# Patient Record
Sex: FEMALE
[De-identification: names, ages, dates, MRNs, and addresses within clinical notes are randomized per-mention and may not be internally consistent; named-entity substitution may affect disease eponyms.]

---

## 2017-05-09 ENCOUNTER — CHARTING TRANS (OUTPATIENT)
Dept: OTHER | Age: 27
End: 2017-05-09

## 2017-05-11 ENCOUNTER — CHARTING TRANS (OUTPATIENT)
Dept: OTHER | Age: 27
End: 2017-05-11

## 2018-02-21 PROCEDURE — 86200 CCP ANTIBODY: CPT | Performed by: INTERNAL MEDICINE

## 2018-03-12 PROBLEM — M25.542 ARTHRALGIA OF BOTH HANDS: Status: ACTIVE | Noted: 2018-03-12

## 2018-03-12 PROBLEM — M25.541 ARTHRALGIA OF BOTH HANDS: Status: ACTIVE | Noted: 2018-03-12

## 2018-04-28 ENCOUNTER — HOSPITAL ENCOUNTER (EMERGENCY)
Facility: HOSPITAL | Age: 28
Discharge: HOME | End: 2018-04-29
Attending: EMERGENCY MEDICINE
Payer: COMMERCIAL

## 2018-04-28 DIAGNOSIS — K52.9 COLITIS: Primary | ICD-10-CM

## 2018-04-28 PROCEDURE — 81025 URINE PREGNANCY TEST: CPT

## 2018-04-28 PROCEDURE — 80048 BASIC METABOLIC PNL TOTAL CA: CPT | Performed by: EMERGENCY MEDICINE

## 2018-04-28 PROCEDURE — 36415 COLL VENOUS BLD VENIPUNCTURE: CPT

## 2018-04-28 PROCEDURE — 80076 HEPATIC FUNCTION PANEL: CPT | Performed by: EMERGENCY MEDICINE

## 2018-04-28 PROCEDURE — 99283 EMERGENCY DEPT VISIT LOW MDM: CPT

## 2018-04-28 PROCEDURE — 83690 ASSAY OF LIPASE: CPT | Performed by: EMERGENCY MEDICINE

## 2018-04-28 PROCEDURE — 81001 URINALYSIS AUTO W/SCOPE: CPT

## 2018-04-28 PROCEDURE — 81001 URINALYSIS AUTO W/SCOPE: CPT | Performed by: EMERGENCY MEDICINE

## 2018-04-28 PROCEDURE — 85025 COMPLETE CBC W/AUTO DIFF WBC: CPT | Performed by: EMERGENCY MEDICINE

## 2018-04-29 VITALS
TEMPERATURE: 97 F | RESPIRATION RATE: 14 BRPM | SYSTOLIC BLOOD PRESSURE: 153 MMHG | OXYGEN SATURATION: 98 % | HEART RATE: 77 BPM | DIASTOLIC BLOOD PRESSURE: 70 MMHG

## 2018-04-29 NOTE — ED PROVIDER NOTES
Patient Seen in: [facility] AND [facility] Emergency Department     History   Patient presents with:  Abdomen/Flank Pain (GI/)    Stated Complaint:     HPI    33year old female complains of left-sided abdominal pain for the past week, associated with diarrhea. otherwise stated in HPI.     Physical Exam   ED Triage Vitals [04/28/18 2104]  BP: 153/70  Pulse: 88  Resp: 20  Temp: (!) 97.2 °F (36.2 °C)  Temp src: Temporal  SpO2: 99 %  O2 Device: None (Room air)    Current:/70   Pulse 88   Temp (!) 97.2 °F (36.2 PANEL (7) - Abnormal; Notable for the following:     AST 48 (*)      (*)     All other components within normal limits   CBC W/ DIFFERENTIAL - Abnormal; Notable for the following:     RBC 5.48 (*)     MCV 76.8 (*)     MCH 25.4 (*)     All other comp evaluation. Medical Record Review:   I personally reviewed available prior medical records for any recent pertinent discharge summaries, testing, and procedures and reviewed those reports.      Radiology Interpretation:   None    Monitor Interpretation: and follow up information were provided prior to discharge from the ED if sent home.  We also recommended that the patient schedule follow up care with a primary care provider as soon as possible to obtain basic health screening including reassessment of bl

## 2018-04-30 ENCOUNTER — HOSPITAL ENCOUNTER (OUTPATIENT)
Age: 28
Discharge: ANOTHER HOSPITAL | End: 2018-04-30
Payer: COMMERCIAL

## 2018-04-30 ENCOUNTER — APPOINTMENT (OUTPATIENT)
Dept: CT IMAGING | Facility: HOSPITAL | Age: 28
End: 2018-04-30
Attending: EMERGENCY MEDICINE
Payer: COMMERCIAL

## 2018-04-30 ENCOUNTER — HOSPITAL ENCOUNTER (EMERGENCY)
Facility: HOSPITAL | Age: 28
Discharge: HOME | End: 2018-04-30
Attending: EMERGENCY MEDICINE
Payer: COMMERCIAL

## 2018-04-30 VITALS
BODY MASS INDEX: 36.29 KG/M2 | DIASTOLIC BLOOD PRESSURE: 65 MMHG | TEMPERATURE: 98 F | WEIGHT: 180 LBS | RESPIRATION RATE: 18 BRPM | HEART RATE: 62 BPM | SYSTOLIC BLOOD PRESSURE: 106 MMHG | OXYGEN SATURATION: 99 % | HEIGHT: 59 IN

## 2018-04-30 VITALS
HEART RATE: 76 BPM | WEIGHT: 183 LBS | RESPIRATION RATE: 16 BRPM | BODY MASS INDEX: 36.89 KG/M2 | DIASTOLIC BLOOD PRESSURE: 69 MMHG | HEIGHT: 59 IN | SYSTOLIC BLOOD PRESSURE: 105 MMHG | OXYGEN SATURATION: 98 % | TEMPERATURE: 98 F

## 2018-04-30 DIAGNOSIS — R10.84 ABDOMINAL PAIN, GENERALIZED: Primary | ICD-10-CM

## 2018-04-30 DIAGNOSIS — R10.9 ABDOMINAL PAIN, ACUTE: Primary | ICD-10-CM

## 2018-04-30 PROCEDURE — 80048 BASIC METABOLIC PNL TOTAL CA: CPT | Performed by: EMERGENCY MEDICINE

## 2018-04-30 PROCEDURE — 81002 URINALYSIS NONAUTO W/O SCOPE: CPT

## 2018-04-30 PROCEDURE — 81001 URINALYSIS AUTO W/SCOPE: CPT | Performed by: EMERGENCY MEDICINE

## 2018-04-30 PROCEDURE — 96360 HYDRATION IV INFUSION INIT: CPT

## 2018-04-30 PROCEDURE — 85025 COMPLETE CBC W/AUTO DIFF WBC: CPT | Performed by: EMERGENCY MEDICINE

## 2018-04-30 PROCEDURE — 99284 EMERGENCY DEPT VISIT MOD MDM: CPT

## 2018-04-30 PROCEDURE — 81025 URINE PREGNANCY TEST: CPT

## 2018-04-30 PROCEDURE — 99212 OFFICE O/P EST SF 10 MIN: CPT

## 2018-04-30 PROCEDURE — 74177 CT ABD & PELVIS W/CONTRAST: CPT | Performed by: EMERGENCY MEDICINE

## 2018-04-30 PROCEDURE — 80076 HEPATIC FUNCTION PANEL: CPT | Performed by: EMERGENCY MEDICINE

## 2018-04-30 PROCEDURE — 96361 HYDRATE IV INFUSION ADD-ON: CPT

## 2018-04-30 PROCEDURE — 83690 ASSAY OF LIPASE: CPT | Performed by: EMERGENCY MEDICINE

## 2018-04-30 RX ORDER — DICYCLOMINE HCL 20 MG
20 TABLET ORAL 4 TIMES DAILY PRN
Qty: 10 TABLET | Refills: 0 | Status: SHIPPED | OUTPATIENT
Start: 2018-04-30 | End: 2018-07-02

## 2018-04-30 NOTE — ED PROVIDER NOTES
Patient presents with:  Abdomen/Flank Pain (GI/)      HPI:     Mamadou Topete is a 33year old female with a past history of seizures presents with generalized abdominal pain.   Patient reports she was seen in the ER on 4/28 and had blood work complet benefit her at this time. Patient states she would like to go to the ER instead of calling to schedule a follow-up appointment with her doctor.       Orders Placed This Encounter      POC Urinalysis Dipstick Once      POCT Pregnancy, Urine    Labs performe

## 2018-04-30 NOTE — ED NOTES
Patient was seen here last week for abdominal pain and was told she had colitis, and to follow up. Patient states she was unable to, and went to IC today for generalized abd pain. IC told her to come here. States she is dizzy, and has diarrhea.

## 2018-04-30 NOTE — ED INITIAL ASSESSMENT (HPI)
Pt presents with intermittent mid abdominal pain since Saturday, was seen in ER. No relief with ibuprofen, describes pain as cramping.

## 2018-04-30 NOTE — ED INITIAL ASSESSMENT (HPI)
Went to the ed 4/28 extensive work up for abd pain and told she had colitis given nothing med wise and told to follow up with pcp. Has not called or made appointment , here now because pain is worse sharp constant diffuse pain.  Still has diarrhea and feels

## 2018-04-30 NOTE — ED PROVIDER NOTES
Patient Seen in: [facility] AND [facility] Emergency Department    History   Patient presents with:  Abdomen/Flank Pain (GI/)    Stated Complaint: abdominal pain    HPI    41-year-old female with history of seizure disorder and Tourette's syndrome presents wit distress  Eyes: pupils equal and round no pallor or injection  ENT, Mouth: mucous membranes moist  Respiratory: there are no retractions, lungs are clear to auscultation  Cardiovascular: regular rate and rhythm  Gastrointestinal:  abdomen is diffusely tend 1700  ------------------------------------------------------------       MDM     Lab and CT results noted. No cause of the patient's pain found at this time. Will discharge the patient home with Bentyl and plans to follow-up with her primary physician.

## 2018-07-02 PROCEDURE — 87591 N.GONORRHOEAE DNA AMP PROB: CPT | Performed by: NURSE PRACTITIONER

## 2018-07-02 PROCEDURE — 81001 URINALYSIS AUTO W/SCOPE: CPT | Performed by: NURSE PRACTITIONER

## 2018-07-02 PROCEDURE — 88175 CYTOPATH C/V AUTO FLUID REDO: CPT | Performed by: NURSE PRACTITIONER

## 2018-07-02 PROCEDURE — 87491 CHLMYD TRACH DNA AMP PROBE: CPT | Performed by: NURSE PRACTITIONER

## 2018-07-10 PROCEDURE — 88305 TISSUE EXAM BY PATHOLOGIST: CPT | Performed by: INTERNAL MEDICINE

## 2018-10-30 PROCEDURE — 81003 URINALYSIS AUTO W/O SCOPE: CPT | Performed by: INTERNAL MEDICINE

## 2018-10-30 PROCEDURE — 81025 URINE PREGNANCY TEST: CPT | Performed by: INTERNAL MEDICINE

## 2018-11-03 VITALS
OXYGEN SATURATION: 98 % | SYSTOLIC BLOOD PRESSURE: 118 MMHG | RESPIRATION RATE: 16 BRPM | HEIGHT: 59 IN | DIASTOLIC BLOOD PRESSURE: 74 MMHG | WEIGHT: 182.61 LBS | TEMPERATURE: 98.2 F | BODY MASS INDEX: 36.81 KG/M2 | HEART RATE: 89 BPM

## 2019-02-06 PROBLEM — G43.909 MIGRAINE: Status: ACTIVE | Noted: 2019-02-06

## 2019-03-25 PROCEDURE — 86480 TB TEST CELL IMMUN MEASURE: CPT | Performed by: FAMILY MEDICINE

## 2019-03-26 PROCEDURE — 88175 CYTOPATH C/V AUTO FLUID REDO: CPT | Performed by: OBSTETRICS & GYNECOLOGY

## 2019-04-12 PROCEDURE — 36415 COLL VENOUS BLD VENIPUNCTURE: CPT | Performed by: FAMILY MEDICINE

## 2019-04-12 PROCEDURE — 80074 ACUTE HEPATITIS PANEL: CPT | Performed by: FAMILY MEDICINE

## 2019-04-17 ENCOUNTER — HOSPITAL ENCOUNTER (EMERGENCY)
Facility: HOSPITAL | Age: 29
Discharge: HOME | End: 2019-04-17
Payer: COMMERCIAL

## 2019-04-17 VITALS
SYSTOLIC BLOOD PRESSURE: 103 MMHG | RESPIRATION RATE: 16 BRPM | TEMPERATURE: 98 F | HEIGHT: 59 IN | OXYGEN SATURATION: 97 % | HEART RATE: 69 BPM | BODY MASS INDEX: 36.29 KG/M2 | DIASTOLIC BLOOD PRESSURE: 64 MMHG | WEIGHT: 180 LBS

## 2019-04-17 DIAGNOSIS — N39.0 URINARY TRACT INFECTION WITHOUT HEMATURIA, SITE UNSPECIFIED: Primary | ICD-10-CM

## 2019-04-17 PROCEDURE — 96365 THER/PROPH/DIAG IV INF INIT: CPT

## 2019-04-17 PROCEDURE — 81001 URINALYSIS AUTO W/SCOPE: CPT

## 2019-04-17 PROCEDURE — 80048 BASIC METABOLIC PNL TOTAL CA: CPT

## 2019-04-17 PROCEDURE — 96375 TX/PRO/DX INJ NEW DRUG ADDON: CPT

## 2019-04-17 PROCEDURE — 93010 ELECTROCARDIOGRAM REPORT: CPT | Performed by: INTERNAL MEDICINE

## 2019-04-17 PROCEDURE — 87086 URINE CULTURE/COLONY COUNT: CPT

## 2019-04-17 PROCEDURE — 93005 ELECTROCARDIOGRAM TRACING: CPT

## 2019-04-17 PROCEDURE — 96361 HYDRATE IV INFUSION ADD-ON: CPT

## 2019-04-17 PROCEDURE — 84484 ASSAY OF TROPONIN QUANT: CPT | Performed by: NURSE PRACTITIONER

## 2019-04-17 PROCEDURE — 81025 URINE PREGNANCY TEST: CPT

## 2019-04-17 PROCEDURE — 99285 EMERGENCY DEPT VISIT HI MDM: CPT

## 2019-04-17 PROCEDURE — 85025 COMPLETE CBC W/AUTO DIFF WBC: CPT

## 2019-04-17 RX ORDER — CEPHALEXIN 500 MG/1
500 CAPSULE ORAL 2 TIMES DAILY
Qty: 14 CAPSULE | Refills: 0 | Status: SHIPPED | OUTPATIENT
Start: 2019-04-17 | End: 2019-04-24

## 2019-04-17 RX ORDER — CEPHALEXIN 500 MG/1
500 CAPSULE ORAL 4 TIMES DAILY
Qty: 40 CAPSULE | Refills: 0 | Status: SHIPPED | OUTPATIENT
Start: 2019-04-17 | End: 2019-04-17 | Stop reason: CLARIF

## 2019-04-17 RX ORDER — KETOROLAC TROMETHAMINE 30 MG/ML
30 INJECTION, SOLUTION INTRAMUSCULAR; INTRAVENOUS ONCE
Status: COMPLETED | OUTPATIENT
Start: 2019-04-17 | End: 2019-04-17

## 2019-04-17 NOTE — ED INITIAL ASSESSMENT (HPI)
Pt c/o headaches, intermittent periods of dizziness + chest and abdominal pain/cramping beginning after starting medication to \"start her period\" 4/10. Pt also states she was in such severe pain on Saturday it \"triggered a seizure\".  Pt denies being see

## 2019-04-18 NOTE — ED NOTES
Pt presents with multiple complaints. Pt states her stomach hurt \"really bad earlier\" and she feels nauseous. Pt also complains of dizziness. Pt ax4, does not appear in any distress.

## 2019-04-18 NOTE — ED PROVIDER NOTES
Patient Seen in: [facility] AND [facility] Emergency Department    History   Patient presents with:  Abdomen/Flank Pain (GI/)  Dizziness (neurologic)    Stated Complaint: Chest Pain/ Dizzy/ Cramps    HPI    Patient complains of lower abdominal pain that began with meals. Meclizine HCl 25 MG Oral Tab,  Take 1 tablet (25 mg total) by mouth 3 (three) times daily as needed for Dizziness (May make you sleepy).        Family History   Problem Relation Age of Onset   • Diabetes Father    • Asthma Mother        Social URINALYSIS WITH CULTURE REFLEX - Abnormal; Notable for the following components:       Result Value    Blood Urine Moderate (*)     Leukocyte Esterase Urine Small (*)     WBC Urine 8 (*)     Bacteria Urine Few (*)     All other components within normal l times daily for 7 days.   Qty: 14 capsule Refills: 0

## 2019-11-05 PROBLEM — F95.8 MOTOR TIC DISORDER: Status: ACTIVE | Noted: 2019-11-05

## 2020-02-25 PROBLEM — K60.2 ANAL FISSURE: Status: ACTIVE | Noted: 2020-02-25

## 2020-02-25 PROBLEM — K64.8 INTERNAL HEMORRHOIDS WITH COMPLICATION: Status: ACTIVE | Noted: 2020-02-25

## 2020-02-25 PROBLEM — K62.5 RECTAL BLEEDING: Status: ACTIVE | Noted: 2020-02-25

## 2020-02-25 PROBLEM — K64.4 EXTERNAL HEMORRHOIDS: Status: ACTIVE | Noted: 2020-02-25

## 2020-08-11 ENCOUNTER — HOSPITAL ENCOUNTER (EMERGENCY)
Facility: HOSPITAL | Age: 30
Discharge: HOME | End: 2020-08-11
Attending: EMERGENCY MEDICINE
Payer: COMMERCIAL

## 2020-08-11 ENCOUNTER — APPOINTMENT (OUTPATIENT)
Dept: CT IMAGING | Facility: HOSPITAL | Age: 30
End: 2020-08-11
Attending: EMERGENCY MEDICINE
Payer: COMMERCIAL

## 2020-08-11 VITALS
DIASTOLIC BLOOD PRESSURE: 72 MMHG | WEIGHT: 195.75 LBS | RESPIRATION RATE: 16 BRPM | TEMPERATURE: 99 F | HEART RATE: 86 BPM | BODY MASS INDEX: 40 KG/M2 | OXYGEN SATURATION: 99 % | SYSTOLIC BLOOD PRESSURE: 110 MMHG

## 2020-08-11 DIAGNOSIS — R10.9 ABDOMINAL PAIN, ACUTE: Primary | ICD-10-CM

## 2020-08-11 LAB
ALBUMIN SERPL-MCNC: 3.7 G/DL (ref 3.4–5)
ALBUMIN/GLOB SERPL: 0.9 {RATIO} (ref 1–2)
ALP LIVER SERPL-CCNC: 116 U/L (ref 37–98)
ALT SERPL-CCNC: 189 U/L (ref 13–56)
ANION GAP SERPL CALC-SCNC: 6 MMOL/L (ref 0–18)
AST SERPL-CCNC: 81 U/L (ref 15–37)
B-HCG UR QL: NEGATIVE
BASOPHILS # BLD AUTO: 0.06 X10(3) UL (ref 0–0.2)
BASOPHILS NFR BLD AUTO: 0.6 %
BILIRUB SERPL-MCNC: 0.5 MG/DL (ref 0.1–2)
BILIRUB UR QL: NEGATIVE
BUN BLD-MCNC: 13 MG/DL (ref 7–18)
BUN/CREAT SERPL: 16.7 (ref 10–20)
CALCIUM BLD-MCNC: 9.2 MG/DL (ref 8.5–10.1)
CHLORIDE SERPL-SCNC: 106 MMOL/L (ref 98–112)
CLARITY UR: CLEAR
CO2 SERPL-SCNC: 26 MMOL/L (ref 21–32)
COLOR UR: YELLOW
CREAT BLD-MCNC: 0.78 MG/DL (ref 0.55–1.02)
DEPRECATED RDW RBC AUTO: 36.4 FL (ref 35.1–46.3)
EOSINOPHIL # BLD AUTO: 0.07 X10(3) UL (ref 0–0.7)
EOSINOPHIL NFR BLD AUTO: 0.7 %
ERYTHROCYTE [DISTWIDTH] IN BLOOD BY AUTOMATED COUNT: 13 % (ref 11–15)
GLOBULIN PLAS-MCNC: 4 G/DL (ref 2.8–4.4)
GLUCOSE BLD-MCNC: 147 MG/DL (ref 70–99)
GLUCOSE UR-MCNC: NEGATIVE MG/DL
HCT VFR BLD AUTO: 41.4 % (ref 35–48)
HGB BLD-MCNC: 13.7 G/DL (ref 12–16)
HGB UR QL STRIP.AUTO: NEGATIVE
IMM GRANULOCYTES # BLD AUTO: 0.04 X10(3) UL (ref 0–1)
IMM GRANULOCYTES NFR BLD: 0.4 %
KETONES UR-MCNC: NEGATIVE MG/DL
LIPASE SERPL-CCNC: 189 U/L (ref 73–393)
LYMPHOCYTES # BLD AUTO: 3.17 X10(3) UL (ref 1–4)
LYMPHOCYTES NFR BLD AUTO: 30.3 %
M PROTEIN MFR SERPL ELPH: 7.7 G/DL (ref 6.4–8.2)
MCH RBC QN AUTO: 25.8 PG (ref 26–34)
MCHC RBC AUTO-ENTMCNC: 33.1 G/DL (ref 31–37)
MCV RBC AUTO: 77.8 FL (ref 80–100)
MONOCYTES # BLD AUTO: 0.54 X10(3) UL (ref 0.1–1)
MONOCYTES NFR BLD AUTO: 5.2 %
NEUTROPHILS # BLD AUTO: 6.59 X10 (3) UL (ref 1.5–7.7)
NEUTROPHILS # BLD AUTO: 6.59 X10(3) UL (ref 1.5–7.7)
NEUTROPHILS NFR BLD AUTO: 62.8 %
NITRITE UR QL STRIP.AUTO: NEGATIVE
OSMOLALITY SERPL CALC.SUM OF ELEC: 289 MOSM/KG (ref 275–295)
PH UR: 6 [PH] (ref 5–8)
PLATELET # BLD AUTO: 316 10(3)UL (ref 150–450)
POTASSIUM SERPL-SCNC: 3.7 MMOL/L (ref 3.5–5.1)
PROT UR-MCNC: NEGATIVE MG/DL
RBC # BLD AUTO: 5.32 X10(6)UL (ref 3.8–5.3)
RBC #/AREA URNS AUTO: 2 /HPF
SODIUM SERPL-SCNC: 138 MMOL/L (ref 136–145)
SP GR UR STRIP: 1.01 (ref 1–1.03)
UROBILINOGEN UR STRIP-ACNC: <2
WBC # BLD AUTO: 10.5 X10(3) UL (ref 4–11)
WBC #/AREA URNS AUTO: 3 /HPF

## 2020-08-11 PROCEDURE — 85025 COMPLETE CBC W/AUTO DIFF WBC: CPT | Performed by: EMERGENCY MEDICINE

## 2020-08-11 PROCEDURE — 74176 CT ABD & PELVIS W/O CONTRAST: CPT | Performed by: EMERGENCY MEDICINE

## 2020-08-11 PROCEDURE — 96372 THER/PROPH/DIAG INJ SC/IM: CPT

## 2020-08-11 PROCEDURE — 83690 ASSAY OF LIPASE: CPT | Performed by: EMERGENCY MEDICINE

## 2020-08-11 PROCEDURE — 80053 COMPREHEN METABOLIC PANEL: CPT | Performed by: EMERGENCY MEDICINE

## 2020-08-11 PROCEDURE — 96374 THER/PROPH/DIAG INJ IV PUSH: CPT

## 2020-08-11 PROCEDURE — 99284 EMERGENCY DEPT VISIT MOD MDM: CPT

## 2020-08-11 PROCEDURE — 81025 URINE PREGNANCY TEST: CPT

## 2020-08-11 PROCEDURE — 81001 URINALYSIS AUTO W/SCOPE: CPT | Performed by: EMERGENCY MEDICINE

## 2020-08-11 RX ORDER — ONDANSETRON 2 MG/ML
4 INJECTION INTRAMUSCULAR; INTRAVENOUS ONCE
Status: COMPLETED | OUTPATIENT
Start: 2020-08-11 | End: 2020-08-11

## 2020-08-11 RX ORDER — DICYCLOMINE HYDROCHLORIDE 10 MG/ML
20 INJECTION INTRAMUSCULAR ONCE
Status: COMPLETED | OUTPATIENT
Start: 2020-08-11 | End: 2020-08-11

## 2020-08-11 RX ORDER — DICYCLOMINE HCL 20 MG
20 TABLET ORAL 4 TIMES DAILY PRN
Qty: 20 TABLET | Refills: 0 | Status: SHIPPED | OUTPATIENT
Start: 2020-08-11 | End: 2020-10-16

## 2020-08-11 NOTE — ED NOTES
Back from ct, no change in condition. Resting on cart in no apparent distress. Will continue to monitor.

## 2020-08-11 NOTE — ED PROVIDER NOTES
Patient Seen in: [facility] Emergency Department      History   Patient presents with:  Abdomen/Flank Pain    Stated Complaint: abd pain    HPI    31-year-old female presents for evaluation of right lower quadrant pain.   Patient reports pain start supple. Cardiovascular:      Rate and Rhythm: Normal rate and regular rhythm. Heart sounds: Normal heart sounds. Pulmonary:      Effort: Pulmonary effort is normal. No respiratory distress. Breath sounds: Normal breath sounds.    Abdominal: RAINBOW DRAW GOLD          Imaging Results Available and Reviewed while in ED: CT ABDOMEN+PELVIS(CPT=74176)   Final Result    PROCEDURE:   CT ABDOMEN+PELVIS(CPT=74176)         COMPARISON:   [facility], CT ABD/PELV WO CON FOR APPY,     4/2 lymphadenopathy. PELVIC ORGANS: No visible mass. Pelvic organs appropriate for patient age. VASCULATURE:   No aneurysm is detected. RETROPERITONEUM: No mass or lymphadenopathy is apparent.       BONES:   Stable punctate left femoral head b evaluation. Oxygen Saturation: 99% on room air, Normal    Consults: No orders of the defined types were placed in this encounter.       MD Discussions or Sign-Outs: None    Impression:  After review and interpretation of the above emergency department wo

## 2020-08-11 NOTE — ED NOTES
No change in condition, resting on cart in no apparent distress. Awaiting ct. Will continue to monitor.

## 2020-08-11 NOTE — ED INITIAL ASSESSMENT (HPI)
Pt c/o epigastric pain that \"travels down to my R lower abdomen. \" x3 days. Pt states she has been nauseous, vomited twice last night. Pt also c/o HA/diarrhea/ productive 'white' cough x3 days, denies blood in stool, +photophobia.  Pt denies covid exposure

## 2020-08-11 NOTE — ED NOTES
rec'd pt from triage, ambulatory. C/o right upper abd pain that radiates to rlq. Onset Friday. Denies n/v/d. States she thinks she might be pregnant and is actively trying. preg test run; negative. Denies urinary symptoms.  #20g iv started to lac, blood

## 2020-09-19 PROBLEM — Z20.822 PERSON UNDER INVESTIGATION FOR COVID-19: Status: ACTIVE | Noted: 2020-09-19

## 2020-10-14 ENCOUNTER — HOSPITAL ENCOUNTER (EMERGENCY)
Facility: HOSPITAL | Age: 30
Discharge: HOME | End: 2020-10-14
Attending: EMERGENCY MEDICINE
Payer: COMMERCIAL

## 2020-10-14 VITALS
HEART RATE: 82 BPM | BODY MASS INDEX: 41 KG/M2 | DIASTOLIC BLOOD PRESSURE: 77 MMHG | SYSTOLIC BLOOD PRESSURE: 121 MMHG | WEIGHT: 202.63 LBS | RESPIRATION RATE: 19 BRPM | OXYGEN SATURATION: 96 %

## 2020-10-14 DIAGNOSIS — G51.0 BELL'S PALSY: Primary | ICD-10-CM

## 2020-10-14 PROCEDURE — 99283 EMERGENCY DEPT VISIT LOW MDM: CPT

## 2020-10-14 RX ORDER — PREDNISONE 20 MG/1
40 TABLET ORAL DAILY
Qty: 12 TABLET | Refills: 0 | Status: SHIPPED | OUTPATIENT
Start: 2020-10-14 | End: 2020-10-16

## 2020-10-14 RX ORDER — PREDNISONE 20 MG/1
40 TABLET ORAL ONCE
Status: COMPLETED | OUTPATIENT
Start: 2020-10-14 | End: 2020-10-14

## 2020-10-14 RX ORDER — CARBOXYMETHYLCELLULOSE SODIUM 10 MG/ML
1 SOLUTION/ DROPS OPHTHALMIC 3 TIMES DAILY
Qty: 15 ML | Refills: 0 | Status: SHIPPED | OUTPATIENT
Start: 2020-10-14 | End: 2021-06-09

## 2020-10-14 NOTE — ED PROVIDER NOTES
Patient Seen in: [facility] AND [facility] Emergency Department      History   Patient presents with:  Headache    Stated Complaint:     HPI    27-year-old female with past medical history significant for migraines, seizure disorder, Tourette syndrome, presents deformity. Lymphadenopathy: No sig cervical LAD   Neurological: Awake, alert. Normal reflexes. Cranial nerves II through XII intact except for right cranial nerve VII palsy  Skin: Skin is warm and dry. No rash noted. No erythema.    Psychiatric:    ED Co

## 2021-03-23 ENCOUNTER — HOSPITAL ENCOUNTER (EMERGENCY)
Facility: HOSPITAL | Age: 31
Discharge: HOME | End: 2021-03-23
Attending: EMERGENCY MEDICINE
Payer: COMMERCIAL

## 2021-03-23 ENCOUNTER — APPOINTMENT (OUTPATIENT)
Dept: CT IMAGING | Facility: HOSPITAL | Age: 31
End: 2021-03-23
Attending: EMERGENCY MEDICINE
Payer: COMMERCIAL

## 2021-03-23 ENCOUNTER — APPOINTMENT (OUTPATIENT)
Dept: ULTRASOUND IMAGING | Facility: HOSPITAL | Age: 31
End: 2021-03-23
Attending: EMERGENCY MEDICINE
Payer: COMMERCIAL

## 2021-03-23 VITALS
WEIGHT: 190.06 LBS | TEMPERATURE: 98 F | HEART RATE: 50 BPM | BODY MASS INDEX: 38 KG/M2 | OXYGEN SATURATION: 98 % | RESPIRATION RATE: 18 BRPM | SYSTOLIC BLOOD PRESSURE: 127 MMHG | DIASTOLIC BLOOD PRESSURE: 71 MMHG

## 2021-03-23 DIAGNOSIS — R07.89 CHEST PAIN, ATYPICAL: Primary | ICD-10-CM

## 2021-03-23 DIAGNOSIS — R10.9 ABDOMINAL PAIN, ACUTE: ICD-10-CM

## 2021-03-23 LAB
ALBUMIN SERPL-MCNC: 3.7 G/DL (ref 3.4–5)
ALP LIVER SERPL-CCNC: 130 U/L
ALT SERPL-CCNC: 98 U/L
ANION GAP SERPL CALC-SCNC: 4 MMOL/L (ref 0–18)
AST SERPL-CCNC: 38 U/L (ref 15–37)
B-HCG UR QL: NEGATIVE
BASOPHILS # BLD AUTO: 0.07 X10(3) UL (ref 0–0.2)
BASOPHILS NFR BLD AUTO: 0.6 %
BILIRUB DIRECT SERPL-MCNC: <0.1 MG/DL (ref 0–0.2)
BILIRUB SERPL-MCNC: 0.3 MG/DL (ref 0.1–2)
BUN BLD-MCNC: 10 MG/DL (ref 7–18)
BUN/CREAT SERPL: 12 (ref 10–20)
CALCIUM BLD-MCNC: 9.6 MG/DL (ref 8.5–10.1)
CHLORIDE SERPL-SCNC: 108 MMOL/L (ref 98–112)
CO2 SERPL-SCNC: 31 MMOL/L (ref 21–32)
CREAT BLD-MCNC: 0.83 MG/DL
DEPRECATED RDW RBC AUTO: 36.3 FL (ref 35.1–46.3)
EOSINOPHIL # BLD AUTO: 0.15 X10(3) UL (ref 0–0.7)
EOSINOPHIL NFR BLD AUTO: 1.2 %
ERYTHROCYTE [DISTWIDTH] IN BLOOD BY AUTOMATED COUNT: 12.8 % (ref 11–15)
GLUCOSE BLD-MCNC: 95 MG/DL (ref 70–99)
HCT VFR BLD AUTO: 41.3 %
HGB BLD-MCNC: 13.4 G/DL
IMM GRANULOCYTES # BLD AUTO: 0.03 X10(3) UL (ref 0–1)
IMM GRANULOCYTES NFR BLD: 0.2 %
LIPASE SERPL-CCNC: 200 U/L (ref 73–393)
LYMPHOCYTES # BLD AUTO: 4.05 X10(3) UL (ref 1–4)
LYMPHOCYTES NFR BLD AUTO: 32.9 %
M PROTEIN MFR SERPL ELPH: 7.4 G/DL (ref 6.4–8.2)
MCH RBC QN AUTO: 25.4 PG (ref 26–34)
MCHC RBC AUTO-ENTMCNC: 32.4 G/DL (ref 31–37)
MCV RBC AUTO: 78.2 FL
MONOCYTES # BLD AUTO: 0.86 X10(3) UL (ref 0.1–1)
MONOCYTES NFR BLD AUTO: 7 %
NEUTROPHILS # BLD AUTO: 7.15 X10 (3) UL (ref 1.5–7.7)
NEUTROPHILS # BLD AUTO: 7.15 X10(3) UL (ref 1.5–7.7)
NEUTROPHILS NFR BLD AUTO: 58.1 %
OSMOLALITY SERPL CALC.SUM OF ELEC: 295 MOSM/KG (ref 275–295)
PLATELET # BLD AUTO: 296 10(3)UL (ref 150–450)
POTASSIUM SERPL-SCNC: 3.8 MMOL/L (ref 3.5–5.1)
RBC # BLD AUTO: 5.28 X10(6)UL
SODIUM SERPL-SCNC: 143 MMOL/L (ref 136–145)
TROPONIN I SERPL-MCNC: <0.045 NG/ML (ref ?–0.04)
WBC # BLD AUTO: 12.3 X10(3) UL (ref 4–11)

## 2021-03-23 PROCEDURE — 80048 BASIC METABOLIC PNL TOTAL CA: CPT

## 2021-03-23 PROCEDURE — 81025 URINE PREGNANCY TEST: CPT

## 2021-03-23 PROCEDURE — 84484 ASSAY OF TROPONIN QUANT: CPT | Performed by: EMERGENCY MEDICINE

## 2021-03-23 PROCEDURE — 84484 ASSAY OF TROPONIN QUANT: CPT

## 2021-03-23 PROCEDURE — S0028 INJECTION, FAMOTIDINE, 20 MG: HCPCS | Performed by: EMERGENCY MEDICINE

## 2021-03-23 PROCEDURE — 93010 ELECTROCARDIOGRAM REPORT: CPT | Performed by: EMERGENCY MEDICINE

## 2021-03-23 PROCEDURE — 85025 COMPLETE CBC W/AUTO DIFF WBC: CPT | Performed by: EMERGENCY MEDICINE

## 2021-03-23 PROCEDURE — 74177 CT ABD & PELVIS W/CONTRAST: CPT | Performed by: EMERGENCY MEDICINE

## 2021-03-23 PROCEDURE — 80048 BASIC METABOLIC PNL TOTAL CA: CPT | Performed by: EMERGENCY MEDICINE

## 2021-03-23 PROCEDURE — 83690 ASSAY OF LIPASE: CPT | Performed by: EMERGENCY MEDICINE

## 2021-03-23 PROCEDURE — 85025 COMPLETE CBC W/AUTO DIFF WBC: CPT

## 2021-03-23 PROCEDURE — 96375 TX/PRO/DX INJ NEW DRUG ADDON: CPT

## 2021-03-23 PROCEDURE — 96361 HYDRATE IV INFUSION ADD-ON: CPT

## 2021-03-23 PROCEDURE — 80076 HEPATIC FUNCTION PANEL: CPT | Performed by: EMERGENCY MEDICINE

## 2021-03-23 PROCEDURE — 76705 ECHO EXAM OF ABDOMEN: CPT | Performed by: EMERGENCY MEDICINE

## 2021-03-23 PROCEDURE — 93005 ELECTROCARDIOGRAM TRACING: CPT

## 2021-03-23 PROCEDURE — 99285 EMERGENCY DEPT VISIT HI MDM: CPT

## 2021-03-23 PROCEDURE — 96374 THER/PROPH/DIAG INJ IV PUSH: CPT

## 2021-03-23 RX ORDER — ONDANSETRON 2 MG/ML
4 INJECTION INTRAMUSCULAR; INTRAVENOUS ONCE
Status: COMPLETED | OUTPATIENT
Start: 2021-03-23 | End: 2021-03-23

## 2021-03-23 RX ORDER — FAMOTIDINE 10 MG/ML
20 INJECTION, SOLUTION INTRAVENOUS ONCE
Status: COMPLETED | OUTPATIENT
Start: 2021-03-23 | End: 2021-03-23

## 2021-03-23 RX ORDER — KETOROLAC TROMETHAMINE 30 MG/ML
30 INJECTION, SOLUTION INTRAMUSCULAR; INTRAVENOUS ONCE
Status: COMPLETED | OUTPATIENT
Start: 2021-03-23 | End: 2021-03-23

## 2021-03-24 NOTE — ED INITIAL ASSESSMENT (HPI)
Pt states she has been vomiting since Sunday, c/o pain to chest/ shortness of breath. State she was seen at a hospital on Monday. She was cleared to go home but still c/o mid chest pain/ RUQ pain, and headache/ bells palsy to right side of face.  States she

## 2021-03-24 NOTE — ED PROVIDER NOTES
Patient Seen in: [facility] AND [facility] Emergency Department    History   Patient presents with:  Chest Pain Angina    Stated Complaint: chest pain    HPI    Patient is here with complaint of multiple symptoms. She first noticed this on Sunday.   She was beto 6 (six) hours as needed for Pain. Carboxymethylcellulose Sodium (ARTIFICIAL TEARS) 1 % Ophthalmic Solution,  Apply 1 drop to eye 3 (three) times daily.    ondansetron 4 MG Oral Tablet Dispersible,  Take 1 tablet (4 mg total) by mouth every 8 (eight) hours in no distress. Vital signs noted. In a brightly lit room moving without difficulty  Eye:  No scleral icterus. Eyelids appear normal, no lesions. Cardiovascular:  Normal S1 and S2, no murmur, regular, with good peripheral perfusion.   Respiratory:  Lung until follow-up. Patient CT did not show any acute process.   I discussed again with her that there is limitations of the testing we can do in the ER she has had very extensive work-up here and yesterday at the other emergency department no clear acute e blood pressure.       Clinical Impression:  Chest pain, atypical  (primary encounter diagnosis)  Abdominal pain, acute    Disposition:  Discharge    Follow-up:  Franny Blanco MD  2 TRANS  PAOLO MARIE  [address] [address] [phone]

## 2021-03-26 ENCOUNTER — HOSPITAL ENCOUNTER (INPATIENT)
Facility: HOSPITAL | Age: 31
LOS: 4 days | Discharge: HOME | DRG: 103 | End: 2021-03-31
Attending: EMERGENCY MEDICINE | Admitting: HOSPITALIST
Payer: COMMERCIAL

## 2021-03-26 DIAGNOSIS — R10.13 EPIGASTRIC PAIN: ICD-10-CM

## 2021-03-26 DIAGNOSIS — R10.13 ABDOMINAL PAIN, EPIGASTRIC: Primary | ICD-10-CM

## 2021-03-26 DIAGNOSIS — N17.9 ACUTE RENAL FAILURE, UNSPECIFIED ACUTE RENAL FAILURE TYPE (HCC): ICD-10-CM

## 2021-03-27 ENCOUNTER — ANESTHESIA EVENT (OUTPATIENT)
Dept: ENDOSCOPY | Facility: HOSPITAL | Age: 31
DRG: 103 | End: 2021-03-27
Payer: COMMERCIAL

## 2021-03-27 ENCOUNTER — APPOINTMENT (OUTPATIENT)
Dept: ULTRASOUND IMAGING | Facility: HOSPITAL | Age: 31
DRG: 103 | End: 2021-03-27
Attending: EMERGENCY MEDICINE
Payer: COMMERCIAL

## 2021-03-27 ENCOUNTER — ANESTHESIA (OUTPATIENT)
Dept: ENDOSCOPY | Facility: HOSPITAL | Age: 31
DRG: 103 | End: 2021-03-27
Payer: COMMERCIAL

## 2021-03-27 PROBLEM — R10.13 ABDOMINAL PAIN, EPIGASTRIC: Status: ACTIVE | Noted: 2021-03-27

## 2021-03-27 PROBLEM — N17.9 ACUTE RENAL FAILURE, UNSPECIFIED ACUTE RENAL FAILURE TYPE (HCC): Status: ACTIVE | Noted: 2021-03-27

## 2021-03-27 PROCEDURE — 76830 TRANSVAGINAL US NON-OB: CPT | Performed by: EMERGENCY MEDICINE

## 2021-03-27 PROCEDURE — 93975 VASCULAR STUDY: CPT | Performed by: EMERGENCY MEDICINE

## 2021-03-27 PROCEDURE — 76856 US EXAM PELVIC COMPLETE: CPT | Performed by: EMERGENCY MEDICINE

## 2021-03-27 PROCEDURE — 0DB68ZX EXCISION OF STOMACH, VIA NATURAL OR ARTIFICIAL OPENING ENDOSCOPIC, DIAGNOSTIC: ICD-10-PCS | Performed by: INTERNAL MEDICINE

## 2021-03-27 PROCEDURE — 0DB98ZX EXCISION OF DUODENUM, VIA NATURAL OR ARTIFICIAL OPENING ENDOSCOPIC, DIAGNOSTIC: ICD-10-PCS | Performed by: INTERNAL MEDICINE

## 2021-03-27 RX ORDER — DICYCLOMINE HCL 20 MG
20 TABLET ORAL EVERY 4 HOURS PRN
Status: DISCONTINUED | OUTPATIENT
Start: 2021-03-27 | End: 2021-03-31

## 2021-03-27 RX ORDER — LIDOCAINE HYDROCHLORIDE 10 MG/ML
INJECTION, SOLUTION EPIDURAL; INFILTRATION; INTRACAUDAL; PERINEURAL AS NEEDED
Status: DISCONTINUED | OUTPATIENT
Start: 2021-03-27 | End: 2021-03-27 | Stop reason: SURG

## 2021-03-27 RX ORDER — ONDANSETRON 2 MG/ML
4 INJECTION INTRAMUSCULAR; INTRAVENOUS EVERY 6 HOURS PRN
Status: DISCONTINUED | OUTPATIENT
Start: 2021-03-27 | End: 2021-03-31

## 2021-03-27 RX ORDER — MECLIZINE HYDROCHLORIDE 25 MG/1
25 TABLET ORAL 3 TIMES DAILY PRN
Status: DISCONTINUED | OUTPATIENT
Start: 2021-03-27 | End: 2021-03-31

## 2021-03-27 RX ORDER — GABAPENTIN 300 MG/1
300 CAPSULE ORAL NIGHTLY
Status: DISCONTINUED | OUTPATIENT
Start: 2021-03-27 | End: 2021-03-31

## 2021-03-27 RX ORDER — MORPHINE SULFATE 4 MG/ML
4 INJECTION, SOLUTION INTRAMUSCULAR; INTRAVENOUS ONCE
Status: COMPLETED | OUTPATIENT
Start: 2021-03-27 | End: 2021-03-27

## 2021-03-27 RX ORDER — PANTOPRAZOLE SODIUM 40 MG/1
40 TABLET, DELAYED RELEASE ORAL
Refills: 0 | Status: DISCONTINUED | OUTPATIENT
Start: 2021-03-27 | End: 2021-03-27

## 2021-03-27 RX ORDER — SODIUM CHLORIDE 9 MG/ML
INJECTION, SOLUTION INTRAVENOUS CONTINUOUS
Status: DISCONTINUED | OUTPATIENT
Start: 2021-03-27 | End: 2021-03-31

## 2021-03-27 RX ORDER — MORPHINE SULFATE 2 MG/ML
2 INJECTION, SOLUTION INTRAMUSCULAR; INTRAVENOUS EVERY 2 HOUR PRN
Status: DISCONTINUED | OUTPATIENT
Start: 2021-03-27 | End: 2021-03-31

## 2021-03-27 RX ORDER — POLYETHYLENE GLYCOL 3350 17 G/17G
17 POWDER, FOR SOLUTION ORAL 2 TIMES DAILY
Status: DISCONTINUED | OUTPATIENT
Start: 2021-03-27 | End: 2021-03-31

## 2021-03-27 RX ORDER — MORPHINE SULFATE 2 MG/ML
1 INJECTION, SOLUTION INTRAMUSCULAR; INTRAVENOUS EVERY 2 HOUR PRN
Status: DISCONTINUED | OUTPATIENT
Start: 2021-03-27 | End: 2021-03-31

## 2021-03-27 RX ORDER — MORPHINE SULFATE 4 MG/ML
4 INJECTION, SOLUTION INTRAMUSCULAR; INTRAVENOUS EVERY 2 HOUR PRN
Status: DISCONTINUED | OUTPATIENT
Start: 2021-03-27 | End: 2021-03-31

## 2021-03-27 RX ORDER — GABAPENTIN 100 MG/1
100 CAPSULE ORAL DAILY
Status: DISCONTINUED | OUTPATIENT
Start: 2021-03-27 | End: 2021-03-31

## 2021-03-27 RX ADMIN — LIDOCAINE HYDROCHLORIDE 50 MG: 10 INJECTION, SOLUTION EPIDURAL; INFILTRATION; INTRACAUDAL; PERINEURAL at 14:14:00

## 2021-03-27 NOTE — ED INITIAL ASSESSMENT (HPI)
Right sided abdominal/back pain since Sunday, 3rd ER visit for same. Denies vomiting diarrhea or fever. Constipation, last bm x 2 weeks.

## 2021-03-27 NOTE — ANESTHESIA PREPROCEDURE EVALUATION
Anesthesia PreOp Note    HPI:     Myah Rodriguez is a 35year old female who presents for preoperative consultation requested by: Krystal Huffman MD    Date of Surgery: 3/26/2021 - 3/27/2021    Procedure(s):  ESOPHAGOGASTRODUODENOSCOPY (EGD)  Indication: tabs in pm, Disp: 120 capsule, Rfl: 5, Past Week at Unknown time  Carboxymethylcellulose Sodium (ARTIFICIAL TEARS) 1 % Ophthalmic Solution, Apply 1 drop to eye 3 (three) times daily. , Disp: 15 mL, Rfl: 0, Past Week at Unknown time  Dicyclomine HCl (BENTYL) Intravenous, Q12H, Evgeny Bruno DO, 40 mg at 03/27/21 0405  ondansetron HCl (ZOFRAN) injection 4 mg, 4 mg, Intravenous, Q6H PRN, Evgeny Bruno DO, 4 mg at 03/27/21 1006  Dicyclomine HCl (BENTYL) tab 20 mg, 20 mg, Oral, Q4H PRN, Shirin Mcghee, DO     Frequency of Communication with Friends and Family:       Frequency of Social Gatherings with Friends and Family:       Attends Rastafari Services:       Active Member of Clubs or Organizations:       Attends Club or Organization Meetings:       [employer] GI/Hepatic/Renal      Comments: Abdominal pain    Endo/Other    Abdominal   (+) obese,                Anesthesia Plan:   ASA:  2  Plan:   MAC  Informed Consent Plan and Risks Discussed With:  Patient      I have informed Jenny Starr and/or legal guanabelle

## 2021-03-27 NOTE — ED NOTES
Orders for admission, patient is aware of plan and ready to go upstairs.  Any questions, please call ED RN Feroz Morales  at extension [phone]  Type of COVID test sent:Rapid  COVID Suspicion level: Low    Titratable drug(s) infusing:  Rate:    LOC at time of qureshi

## 2021-03-27 NOTE — CONSULTS
[facility]    Report of Consultation    Corona Ken Patient Status:  Inpatient    1990 MRN [id number]   Location [facility] 4W/SW/SE Attending Nicolas Tian, DO   Hosp Day # 0 PCP Rolena Meckel, MD     Date of Admissi Comment: occsaional    Drug use: No       Current Medications:  [START ON 3/28/2021] influenza vaccine split quad (FLULAVAL) ages 6 months to 64 years inj 0.5ml, 0.5 mL, Intramuscular, Prior to discharge  0.9% NaCl infusion, , Intravenous, Continuous  mor Tightness in Throat    Review of Systems:     ROS:  No fevers, chills, night sweats. No weight loss nor weight gain. +nausea, vomiting.   Denies diarrhea but + constipation, no persistent changes in caliber of stool, no dysphagia, no rita 3/23/2021 8/14/2020 8/11/2020   Glucose      70 - 99 mg/dL   147 (H)   Sodium      136 - 145 mmol/L   138   Potassium      3.5 - 5.1 mmol/L   3.7   Chloride      98 - 112 mmol/L   106   Carbon Dioxide, Total      21.0 - 32.0 mmol/L   26.0   ANION GAP 0.658 04/12/2019     03/26/2021    CRP 0.87 04/10/2020    TROP <0.045 03/23/2021    CK 66 04/10/2020    RPR Non-Reactive 06/30/2011       Component      Latest Ref Rng & Units 3/27/2021 3/26/2021 3/23/2021   WBC      4.0 - 11.0 x10(3) uL 10.6 14.9 ( ductal dilatation, or atrophy.     SPLEEN: No enlargement.     ADRENALS:   No defined mass or abnormal enlargement.     KIDNEYS:   Symmetric enhancement is seen without evidence of hydronephrosis or underlying solid masses.    GI/MESENTERY: There is no evid etiology - numbers improving (likely 2/2 to fasting state)    -outpt work up    Constipation   -Titrate up miralax   -If no bm by this evening would do a bowel prep along with enema/supp to help evacuate stool    Plan for EGD with possible biopsies and pos

## 2021-03-27 NOTE — ED PROVIDER NOTES
Patient Seen in: [facility] AND [facility] Emergency Department      History   Patient presents with:  Abdomen/Flank Pain    Stated Complaint: abd pain; back pain    HPI/Subjective:   HPI    26-year-old female with history of seizure disorder (last seizure in 20 noted in HPI. Constitutional and vital signs reviewed. All other systems reviewed and negative except as noted above.     Physical Exam     ED Triage Vitals   BP 03/26/21 2036 101/68   Pulse 03/26/21 2035 95   Resp 03/26/21 2035 18   Temp 03/26/21 203 (*)     All other components within normal limits   CBC W/ DIFFERENTIAL - Abnormal; Notable for the following components:    WBC 14.9 (*)     MCV 78.2 (*)     MCH 25.1 (*)     Neutrophil Absolute Prelim 11.76 (*)     Neutrophil Absolute 11.76 (*)     All o pain, epigastric R10.13 3/27/2021 Unknown

## 2021-03-27 NOTE — OPERATIVE REPORT
EGD Operative Report    Cloyce Fraction Patient Status:  Inpatient    1990 MRN [id number]   [employer] protonix or omeprazole: Take 1 tablet by mouth 30 minutes before breakfast daily  3. Follow up with GI clinic in 7-10 days to decide next step    Discharge:   The patient was given an after visit summary detailing the procedure, findings, recommendations an

## 2021-03-27 NOTE — PROGRESS NOTES
Took over care at 1600. Pt denies any nausea or vomiting. Tolerates clear liquid diet after EGD. Golytely started as ordered. Pt refused suppository. IVF infusing at 150 mls/hr. Nephrology on consult for elevated Cr. Kidney ultrasound ordered.  Pt voids jose e

## 2021-03-27 NOTE — ANESTHESIA POSTPROCEDURE EVALUATION
Patient: Homer Guevara    Procedure Summary     Date: 03/27/21 Room / Location: [address] [place] [address] ENDOSCOPY 01 / [address] [place] [address] ENDOSCOPY    Anesthesia Start: 5843 Anesthesia Stop: 7964    Procedure: ESOPHAGOGASTRODUODENOSCOPY (EGD) (N/A ) Diagnosis:       Epigastric pain

## 2021-03-27 NOTE — PLAN OF CARE
Oriented. From home. Up ad apollo, gait steady. Voiding adequate urine output clear yellow. Abdominal pain controlled with ice pack and morphine, zofran for nausea. SCDs on in bed. EGD with biopsies completed.  Likely home tomorrow to follow up with GI MD. function  Description: INTERVENTIONS:  - Assess bowel function  - Maintain adequate hydration with IV or PO as ordered and tolerated  - Evaluate effectiveness of GI medications  - Encourage mobilization and activity  - Obtain nutritional consult as needed and caregiver  - Include patient/family/discharge partner in discharge planning  - Arrange for needed discharge resources and transportation as appropriate  - Identify discharge learning needs (meds, wound care, etc)  - Arrange for interpreters to assist a

## 2021-03-27 NOTE — H&P
[facility] Hospitalist H&P       CC: Abdominal pain     PCP: Aleks Villalba MD    History of Present Illness:   Mrs. Ann Casillas is a 35year old female with history of bells palsy, seizure disorder, migraines headaches, vertigo, who presents to the hospital fo Capsule Delayed Release, TAKE 1 CAPSULE(40 MG) BY MOUTH DAILY, Disp: 30 capsule, Rfl: 0  Dicyclomine HCl 20 MG Oral Tab, Take 1 tablet (20 mg total) by mouth 4 (four) times daily before meals and nightly., Disp: 80 tablet, Rfl: 1      Soc Hx  Social Histor ASSESSMENT / PLAN:   Mrs. Angelica Rivera is a 35year old female with history of bells palsy, seizure disorder, migraines headaches, vertigo, who presents to the hospital for evaluation of abdominal pain.      Epigastric pain  Constipation  -work done in past

## 2021-03-28 ENCOUNTER — APPOINTMENT (OUTPATIENT)
Dept: ULTRASOUND IMAGING | Facility: HOSPITAL | Age: 31
DRG: 103 | End: 2021-03-28
Attending: INTERNAL MEDICINE
Payer: COMMERCIAL

## 2021-03-28 PROCEDURE — 76775 US EXAM ABDO BACK WALL LIM: CPT | Performed by: INTERNAL MEDICINE

## 2021-03-28 NOTE — PLAN OF CARE
Problem: Patient Centered Care  Goal: Patient preferences are identified and integrated in the patient's plan of care  Description: Interventions:  - What would you like us to know as we care for you?  I don't feel good  - Provide timely, complete, and ac routine/schedule  - Consider collaborating with pharmacy to review patient's medication profile  Outcome: Progressing  Note: No BM this evening. Refused glycerin suppository and miralax and declined to continue drinking golytely due to nausea & vomitting. resources  Description: INTERVENTIONS:  - Identify barriers to discharge w/pt and caregiver  - Include patient/family/discharge partner in discharge planning  - Arrange for needed discharge resources and transportation as appropriate  - Identify discharge

## 2021-03-28 NOTE — CONSULTS
[facility]    Report of Consultation    Yamila Velazquez Patient Status:  Inpatient    1990 MRN [id number]   Location [facility] 4W/SW/SE Attending Mary Hodges, [address] [facility] Day # 1 PCP Aleks Villalba MD     Date of Admissi Or  morphINE sulfate (PF) 2 MG/ML injection 2 mg, 2 mg, Intravenous, Q2H PRN   Or  morphINE sulfate (PF) 4 MG/ML injection 4 mg, 4 mg, Intravenous, Q2H PRN  Pantoprazole Sodium (PROTONIX) 40 mg in Sodium Chloride (PF) 0.9 % 10 mL IV push, 40 mg, Intravenou 142/81, pulse 63, temperature 98.7 °F (37.1 °C), temperature source Oral, resp. rate 16, height 4' 11\" (1.499 m), weight 194 lb 4.8 oz (88.1 kg), SpO2 95 %, not currently breastfeeding. General: No acute distress. Alert and oriented x 3.   HEENT: Moist Will obtain JULIANNA, ANCA, HIV, HepB and C, PLA2r, Cryoglobulins and Complements  - No acute indication for RRT. - If renal function improved will not need renal biopsy however if renal function not improved then will go ahead with renal biopsy.   - Avoid neph

## 2021-03-28 NOTE — PLAN OF CARE
Problem: GASTROINTESTINAL - ADULT  Goal: Minimal or absence of nausea and vomiting  Description: INTERVENTIONS:  - Maintain adequate hydration with IV or PO as ordered and tolerated  - Nasogastric tube to low intermittent suction as ordered  - Evaluate e Carl Richardson RN  Outcome: Progressing  3/28/2021 1605 by Aditi Carrillo RN  Outcome: Progressing     Problem: SAFETY ADULT - FALL  Goal: Free from fall injury  Description: INTERVENTIONS:  - Assess pt frequently for physical needs  - Identify cognitive and Pt received suppository and miralax this am with positive results. US kidney and nephrology consult completed for elevated cr level. Continue with IVF as ordered. Plan: home when kidney function improve. [de-identified] : Patient is well nourished, well-developed, in no acute distress, with appropriate mood and affect. The patient is oriented to time, place, and person. Respirations are even and unlabored. Gait evaluation does not reveal a limp. There is no inguinal adenopathy. Examination of the contralateral hip shows normal range of motion, strength, no tenderness, and intact skin. The affected limb is well-perfused and showed 2+ dp/pt pulses, without skin lesions, shows a grossly normal motor and sensory examination. Examination of the hip shows no skin lesions. Hip motion is full and painless from 0-90 degrees extension to flexion, 20 degrees adduction and 20 degrees abduction, and 15 degrees internal and 30 degrees external rotation. Leg lengths are approximately equal. FADIR is negative and CRIS is negative. Stinchfield test is negative. Both hips are stable and muscle strength is normal with good strength with resisted abduction and adduction. Pedal pulses are palpable. [de-identified] : AP and lateral x-rays of the left hip, pelvis, and femur were ordered and taken in the office and demonstrate degenerative joint disease of the hip with joint space narrowing, osteophyte formation, and subchondral sclerosis.\par

## 2021-03-28 NOTE — PROGRESS NOTES
[facility] Hospitalist Progress Note     CC: Hospital Follow up    PCP: Maria M Gresham MD       Assessment/Plan:   Mrs. Teodoro Newell is a 35year old female with history of bells palsy, seizure disorder, migraines headaches, vertigo, who presents to the hospital f 0957 : 194 lb (88 kg)    /81 (BP Location: Right arm)   Pulse 63   Temp 98.7 °F (37.1 °C) (Oral)   Resp 16   Ht 4' 11\" (1.499 m)   Wt 194 lb 4.8 oz (88.1 kg)   LMP  (LMP Unknown)   SpO2 95%   BMI 39.24 kg/m²   General: Alert, no acute distress  HEEN 3350  17 g Oral BID     • sodium chloride 150 mL/hr at 03/28/21 0501     influenza virus vaccine PF, morphINE sulfate **OR** morphINE sulfate **OR** morphINE sulfate, ondansetron HCl, Dicyclomine HCl, Meclizine HCl    Shirin Mcghee DO

## 2021-03-28 NOTE — PROGRESS NOTES
[facility]    Progress Note    Kemi Dickey Patient Status:  Inpatient    1932 MRN [id number]   Location [facility] 3W/SW Attending Elmyra Nageotte, MD   Hosp Day # 1 PCP Bassam Mckay MD     Date of Admission:  2021 normal and conjunctivae and sclerae normal  Throat: lips, mucosa, and tongue normal; teeth and gums normal  Neck: no adenopathy, supple, symmetrical, trachea midline and thyroid not enlarged, symmetric, no tenderness/mass/nodules  Pulmonary: clear to auscu versus non viral etiology - numbers improving (likely 2/2 to fasting state)                -outpt work up     Constipation                 -Titrate up miralax                -If no bm by this evening would do a bowel prep along with enema/supp to help evac

## 2021-03-29 RX ORDER — ACETAMINOPHEN 325 MG/1
650 TABLET ORAL EVERY 6 HOURS PRN
Status: DISCONTINUED | OUTPATIENT
Start: 2021-03-29 | End: 2021-03-31

## 2021-03-29 NOTE — PROGRESS NOTES
Pt being treated for some abdominal discomfort. Pt also suffers from [address] [place] [address].   Pt has not seen her child Vanesa Quijano in several days because of her hospitalization, and seeks prayers not only for her but also her family and the staff here at the [facility]

## 2021-03-29 NOTE — PROGRESS NOTES
[facility] Hospitalist Progress Note     CC: Hospital Follow up    PCP: Yeny Quijano MD       Assessment/Plan:     Ms. Moni Hernandez is a 35year old female with history of bells palsy, seizure disorder, migraines headaches, vertigo, who presents to the hospital Intake 3287 ml   Output 3150 ml   Net 137 ml     Last 3 Weights  03/29/21 0647 : 201 lb 9.6 oz (91.4 kg)  03/27/21 0301 : 194 lb 4.8 oz (88.1 kg)  03/24/21 0957 : 194 lb (88 kg)  03/23/21 1953 : 190 lb 0.6 oz (86.2 kg)    /74 (BP Location: Right ar • pantoprazole (PROTONIX) IV push  40 mg Intravenous Q12H   • gabapentin  100 mg Oral Daily   • gabapentin  300 mg Oral Nightly   • PEG 3350  17 g Oral BID     • sodium chloride 75 mL/hr at 03/29/21 1311     influenza virus vaccine PF, morphINE sulfate

## 2021-03-29 NOTE — PLAN OF CARE
Patient calm, pleasant, resting in bed, alert and oriented x 4, on room air, independent, reporting right sided flank pain and bloating, on continuous 0.9 NS @ 75 mL/hr, strict I&O's, advanced diet from clear liquids, patient tolerating well, reports migra nutritional consult as needed  - Evaluate fluid balance  Outcome: Progressing  Goal: Maintains or returns to baseline bowel function  Description: INTERVENTIONS:  - Assess bowel function  - Maintain adequate hydration with IV or PO as ordered and tolerated Discharge to home or other facility with appropriate resources  Description: INTERVENTIONS:  - Identify barriers to discharge w/pt and caregiver  - Include patient/family/discharge partner in discharge planning  - Arrange for needed discharge resources and

## 2021-03-29 NOTE — PLAN OF CARE
Problem: Patient Centered Care  Goal: Patient preferences are identified and integrated in the patient's plan of care  Description: Interventions:  - What would you like us to know as we care for you?  I'm feeling better  - Provide timely, complete, and a activity  - Obtain nutritional consult as needed  - Establish a toileting routine/schedule  - Consider collaborating with pharmacy to review patient's medication profile  Outcome: Progressing  Note: Selena Yancey has had several bowel movements today.  Continues INTERVENTIONS:  - Identify barriers to discharge w/pt and caregiver  - Include patient/family/discharge partner in discharge planning  - Arrange for needed discharge resources and transportation as appropriate  - Identify discharge learning needs (meds, wo

## 2021-03-29 NOTE — PROGRESS NOTES
NEPHROLOGY DAILY PROGRESS NOTE     Follow up Reason: ANDER     SUBJECTIVE:  No n/v, no SOB, voiding a lot, no hematuria    Diet advanced today, tolerated lunch     OBJECTIVE:    Total Intake/Output:    Intake/Output Summary (Last 24 hours) at 3/29/2021 1351 improved, diet advanced today    - As per primary    Dw Dr. Tariq Reeves and RN     We will continue to follow [address] [facility].  Pita Alexander, [address] [facility] - Nephrology

## 2021-03-29 NOTE — PROGRESS NOTES
GI  PROGRESS NOTE    SUBJECTIVE: no nausea; has migraine now x 2 hours;       OBJECTIVE:  Temp:  [98.2 °F (36.8 °C)-99.1 °F (37.3 °C)] 99.1 °F (37.3 °C)  Pulse:  [55-76] 76  Resp:  [16-18] 18  BP: (124-161)/(73-93) 124/74  Exam  Gen: No acute distress, a PLAN: 1.) U porphyrins/tox; am cortisol    2.) would consider neurology consultation    3.) Diet as tolerated     Philip Nair MD  [address] [facility] Gastroenterology   _______________________________________________________________

## 2021-03-30 PROCEDURE — 99223 1ST HOSP IP/OBS HIGH 75: CPT | Performed by: OTHER

## 2021-03-30 RX ORDER — SODIUM CHLORIDE 9 MG/ML
INJECTION, SOLUTION INTRAVENOUS EVERY 6 HOURS
Status: ACTIVE | OUTPATIENT
Start: 2021-03-30 | End: 2021-03-31

## 2021-03-30 RX ORDER — DIPHENHYDRAMINE HYDROCHLORIDE 50 MG/ML
25 INJECTION INTRAMUSCULAR; INTRAVENOUS EVERY 6 HOURS
Status: DISCONTINUED | OUTPATIENT
Start: 2021-03-30 | End: 2021-03-30

## 2021-03-30 RX ORDER — SODIUM CHLORIDE 9 MG/ML
INJECTION, SOLUTION INTRAVENOUS CONTINUOUS
Status: ACTIVE | OUTPATIENT
Start: 2021-03-30 | End: 2021-03-30

## 2021-03-30 RX ORDER — METOCLOPRAMIDE 10 MG/1
10 TABLET ORAL EVERY 6 HOURS
Status: COMPLETED | OUTPATIENT
Start: 2021-03-30 | End: 2021-03-30

## 2021-03-30 NOTE — PROGRESS NOTES
NEPHROLOGY DAILY PROGRESS NOTE     Follow up Reason: ANDER     SUBJECTIVE:  Had R sided flank / abd pain earlier this AM, also had HA.   Pain better now  Appetite decreased this AM, did not eat breakfast.        OBJECTIVE:    Total Intake/Output:    Intake/Ou time.   - follow renal fxn and I/Os      Abdominal pain:  - n/v improved, diet advanced   - As per primary    Dw Dr. Josefa Pollack: If eating / drinking better later today, may be able to discharge from a renal standpoint. Rashid Evans.  MD Endy Celaya

## 2021-03-30 NOTE — PROGRESS NOTES
[facility] Hospitalist Progress Note     CC: Hospital Follow up    PCP: Amando Patiño MD       Assessment/Plan:     Ms. Harrison Franco is a 35year old female with history of bells palsy, seizure disorder, migraines headaches, vertigo, who presents to the hospital 3/30/2021 1439  Last data filed at 3/30/2021 0444  Gross per 24 hour   Intake 2400 ml   Output 1450 ml   Net 950 ml     Last 3 Weights  03/29/21 0647 : 201 lb 9.6 oz (91.4 kg)  03/27/21 0301 : 194 lb 4.8 oz (88.1 kg)  03/24/21 0957 : 194 lb (88 kg)  03/23/ gabapentin  300 mg Oral Nightly   • PEG 3350  17 g Oral BID     • sodium chloride Stopped (03/30/21 1100)   • sodium chloride 75 mL/hr at 03/30/21 0444     acetaminophen, Rimegepant Sulfate, influenza virus vaccine PF, morphINE sulfate **OR** morphINE sulf

## 2021-03-30 NOTE — PROGRESS NOTES
Brief neurology PN    Pt suspected to have abdominal migraine. Tx for abdominal migraine is same as common migraine. Will order migraine cocktail and avoid nsaids given pravin. Also rec d/cing all opioids as these can potentate migraine pain.   Full consul

## 2021-03-30 NOTE — PLAN OF CARE
Patient calm, pleasant resting in bed, alert and oriented x 4, neuro checks every 4 hours, on room air, independent, per neurologist- Dr. Norwood How started treatment for abdominal migraine (Depacon, Reglan, Benadryl, 0.9 NS), pain improved-pt declines pain cu hydration with IV or PO as ordered and tolerated  - Nasogastric tube to low intermittent suction as ordered  - Evaluate effectiveness of ordered antiemetic medications  - Provide nonpharmacologic comfort measures as appropriate  - Advance diet as tolerated activity based on assessment  - Modify environment to reduce risk of injury  - Provide assistive devices as appropriate  - Consider OT/PT consult to assist with strengthening/mobility  - Encourage toileting schedule  Outcome: Progressing     Problem: [facility]

## 2021-03-30 NOTE — PLAN OF CARE
Patient up independently, ambulating frequently. Strict I&Os and daily weight. Normal saline running at 75/hr. Tolerated general diet at dinner, denies nausea. Patient took a shower before bed. She continues to have loose stool, LBM evening 3/30.   Tash Valencia tolerated  - Nasogastric tube to low intermittent suction as ordered  - Evaluate effectiveness of ordered antiemetic medications  - Provide nonpharmacologic comfort measures as appropriate  - Advance diet as tolerated, if ordered  - Obtain nutritional cons environment to reduce risk of injury  - Provide assistive devices as appropriate  - Consider OT/PT consult to assist with strengthening/mobility  - Encourage toileting schedule  Outcome: Progressing     Problem: DISCHARGE PLANNING  Goal: Discharge to home

## 2021-03-30 NOTE — PROGRESS NOTES
Pt is journaling today, is actively reflecting on the mistakes she perceives she made in her 25s and is looking toward learning from them upon a new start.   She is the single mom of a 9-yr-old Elle Led) and is actively seeking to do some grieving she felt

## 2021-03-30 NOTE — BH PROGRESS NOTE
3/30/2021 at 1416: Psych Liaison spoke with BRENDAN Lott by phone to see if Kay Collins was more alert for consultation. Antonia reported that Kay Collins was still very drowsy. Psych Liaison will complete consultation tomorrow, 3/31/21. MD and BRENDAN aware.      Leonce Seip

## 2021-03-30 NOTE — PROGRESS NOTES
GI  PROGRESS NOTE    SUBJECTIVE: no new complaints; sleepy.         OBJECTIVE:  Temp:  [98.3 °F (36.8 °C)-99 °F (37.2 °C)] 98.4 °F (36.9 °C)  Pulse:  [45-70] 57  Resp:  [16-20] 16  BP: (105-148)/(63-87) 125/68  Exam  Gen: No acute distress, alert and orie C/o non-specific R flank, lower abd discomfort/paints. No evidence of organic GI pathology at this time. H/o IBS-C. U-tox negative. Am cortisol nl. apprecite input from Neurology colleagues re migraines. Cr 1.59.     ?  Migraine variant/intestinal migraine

## 2021-03-31 VITALS
SYSTOLIC BLOOD PRESSURE: 140 MMHG | OXYGEN SATURATION: 95 % | RESPIRATION RATE: 16 BRPM | WEIGHT: 188.63 LBS | TEMPERATURE: 99 F | HEART RATE: 63 BPM | HEIGHT: 59 IN | DIASTOLIC BLOOD PRESSURE: 91 MMHG | BODY MASS INDEX: 38.03 KG/M2

## 2021-03-31 PROBLEM — F45.0 ANXIETY WITH SOMATIZATION: Status: ACTIVE | Noted: 2021-03-31

## 2021-03-31 PROBLEM — F39 EPISODIC MOOD DISORDER (HCC): Status: ACTIVE | Noted: 2021-03-31

## 2021-03-31 PROBLEM — F41.9 ANXIETY WITH SOMATIZATION: Status: ACTIVE | Noted: 2021-03-31

## 2021-03-31 PROCEDURE — 90792 PSYCH DIAG EVAL W/MED SRVCS: CPT | Performed by: OTHER

## 2021-03-31 RX ORDER — RIMEGEPANT SULFATE 75 MG/75MG
75 TABLET, ORALLY DISINTEGRATING ORAL AS NEEDED
COMMUNITY

## 2021-03-31 RX ORDER — ARIPIPRAZOLE 2 MG/1
1 TABLET ORAL NIGHTLY
Qty: 15 TABLET | Refills: 1 | Status: SHIPPED | OUTPATIENT
Start: 2021-03-31 | End: 2021-06-09

## 2021-03-31 RX ORDER — ARIPIPRAZOLE 2 MG/1
1 TABLET ORAL NIGHTLY
Status: DISCONTINUED | OUTPATIENT
Start: 2021-03-31 | End: 2021-03-31

## 2021-03-31 NOTE — PLAN OF CARE
Pt's vital signs stable. Denies pain. Alert and oriented x4. CMS intact. On room air. Tele #73 in place, SB. On room air. Tolerating general diet. Voids freely. Ambulates independently. Appropriate safety precautions maintained.  Bed locked in lowest positi patient's stated pain goal  Description: INTERVENTIONS:  - Encourage pt to monitor pain and request assistance  - Assess pain using appropriate pain scale  - Administer analgesics based on type and severity of pain and evaluate response  - Implement non-ph to Case Management Department for coordinating discharge planning if the patient needs post-hospital services based on physician/LIP order or complex needs related to functional status, cognitive ability or social support system  Outcome: Progressing

## 2021-03-31 NOTE — CONSULTS
[facility]  [address] [facility]  [phone] [address]    Report of Consultation  Yamila Velazquez Patient Status:  Inpatient     1990 MRN migraine. Regarding her seizure history, she reports that she developed seizures at age 14. She is not had a seizure in many years.     She denies any red flags associated with her headache including Valsalva, positionality , or pulse synchronous tinnit Difficulty of Paying Living Expenses:   Food Insecurity:       Worried About [address] in the Last Year:       Ran Out of Food in the Last Year:   Transportation Needs:       Lack of Transportation (Medical):       Lack of Transportation (Non-Med flare her nostril symmetrically. Hearing grossly intact. Tongue midline. No atrophy or fasiculations of the tongue noted. Palate and uvula elevate symmetrically. Shoulder shrug symmetric.    - Motor:  normal tone, normal bulk. No interosseous wasting.  Westly Gathers times daily before meals and nightly   • Dicyclomine HCl (BENTYL) 10 mg, Oral, 3 times daily before meals and nightly   • gabapentin 100 MG Oral Cap 1 tab in am and 3 tabs in pm   • Meclizine HCl (ANTIVERT) 25 mg, Oral, 3 times daily PRN   • naproxen (NAPR GFR, Non- 24 (L) >=60    GFR, -American 27 (L) >=60   CBC W/ DIFFERENTIAL    Collection Time: 03/28/21  7:27 AM   Result Value Ref Range    WBC 12.0 (H) 4.0 - 11.0 x10(3) uL    RBC 4.77 3.80 - 5.30 x10(6)uL    HGB 12.1 12.0 - 16. 0 Range    Amphetamine Urine Negative Negative    Barbiturates Urine Negative Negative    Benzodiazepines Urine Negative Negative    Cannabinoid Urine Negative Negative    Cocaine Urine Negative Negative    Ecstasy Urine Negative Negative    Methadone Urine from a peripheral 7th nerve palsy, her exam is nonfocal.  She denies any symptoms concerning for pseudotumor. Abdominal migraine is a well-documented manifestation of migraine in the pediatric population.   It can occur in adults, and is often diagnosed wh
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
## 2021-03-31 NOTE — PLAN OF CARE
Problem: GASTROINTESTINAL - ADULT  Goal: Minimal or absence of nausea and vomiting  Description: INTERVENTIONS:  - Maintain adequate hydration with IV or PO as ordered and tolerated  - Nasogastric tube to low intermittent suction as ordered  - Evaluate e indicated by assessment.  - Educate pt/family on patient safety including physical limitations  - Instruct pt to call for assistance with activity based on assessment  - Modify environment to reduce risk of injury  - Provide assistive devices as appropriat

## 2021-03-31 NOTE — PROGRESS NOTES
NEPHROLOGY DAILY PROGRESS NOTE     Follow up Reason: ANDER     SUBJECTIVE:  No complaints this AM        OBJECTIVE:    Total Intake/Output:    Intake/Output Summary (Last 24 hours) at 3/31/2021 1046  Last data filed at 3/31/2021 0744  Gross per 24 hour   Int Gladis Vick    We will continue to follow. Melissa Cee.  Viktoriya Caldwell, [address] [facility]\"" - Nephrology

## 2021-03-31 NOTE — BH PROGRESS NOTE
Behavioral Health Note:  REASON FOR ADMISSION:   Abdominal pain, vomiting    REASON FOR CONSULT:  Psychiatry consultation requested for med recs and second opinion following initial liaison only consultation    OBJECTIVE:  Maria Dolores Brennan is an engaged 35year old never really dealt with either. Soledad Orellana reports the following physical sx when she begins to feel anxious or panicked: shaky, headaches, stomachaches, nausea, increased heart rate, SOB and restlessness.  She reports not remembering if she experienced th 8/10 (10 being the highest) over the past two weeks, coinciding with onset of physical sx.      Lubamelissa Nohemi denies SI/HI/SIB and was in agreement that a psychiatry consultation would very beneficial.     PAST PSYCHIATRIC HISTORY:  Past diagnosis: nothing formal trauma/stressor related disorder, r/o unspecified somatization    PLAN  1. Psychiatry consultation ordered for Dr. Justin Brunner  2.  Referrals provided for all outpatient therapy and psychiatry providers in network with [facility]

## 2021-04-01 NOTE — PROGRESS NOTES
Fleurette Lombard ,    I have reviewed your labs below are my recommendations and/or comments please let us know if you have any questions.:    No evidence of vascular causes for your renal dysfunction will forward to dr Ronaldo Young covering [facility]

## 2021-04-01 NOTE — DISCHARGE SUMMARY
General Medicine Discharge Summary     Patient ID:  Lynne Newton  35year old  4/8/1990    Admit date: 3/26/2021    Discharge date and time: 03/31/21    Attending Physician: No att. providers found     Primary Care Physician: Abel Kathleen MD abnormalities.  Biopsies taken of stomach and duodenum.   -bowel regimen   -resumed her bentyl and gabapentin on admission    - GI concerned for possible abdominal migraine  - Neuro consulted, started migraine cocktail, very drowsy with IV benadryl, will d/ Dicyclomine HCl 10 MG Caps  Commonly known as: Bentyl  Take 1 capsule (10 mg total) by mouth 4 (four) times daily before meals and nightly.     gabapentin 100 MG Caps  Commonly known as: NEURONTIN  1 tab in am and 3 tabs in pm     Meclizine HCl 25 MG Tabs as Intensive Outpatient or Partial Hospitalization Programming, call [facility] location: [phone] to schedule an outpatient assessment for programming.           Follow-up with labs: ADELA in 1 week    Total Time Coordinating Care: ADRIANA

## 2021-05-12 PROBLEM — R68.89: Status: ACTIVE | Noted: 2021-05-12

## 2021-05-12 PROBLEM — F33.1 MAJOR DEPRESSIVE DISORDER, RECURRENT, MODERATE (HCC): Status: ACTIVE | Noted: 2021-05-12

## 2021-06-16 ENCOUNTER — PATIENT OUTREACH (OUTPATIENT)
Dept: CASE MANAGEMENT | Age: 31
End: 2021-06-16

## 2021-06-16 NOTE — PROGRESS NOTES
Name: Manuel Hernandez       : 1990   Assessment obtained via: Telephone        CASE CLOSED DATE/ REASON FOR CLOSURE:      DIAGNOSIS/ TREATMENT:    Mental Health Diagnosis: Anxiety w/Somatization, Depression Unsp.     Medical Comorbities:  None note mouth as needed. , Disp: , Rfl:   gabapentin 100 MG Oral Cap, 1 tab in am and 3 tabs in pm, Disp: 120 capsule, Rfl: 5    No current facility-administered medications on file prior to visit.        Patient/Caregiver verbalized compliance with medications  Yes

## 2021-07-15 ENCOUNTER — PATIENT OUTREACH (OUTPATIENT)
Dept: CASE MANAGEMENT | Age: 31
End: 2021-07-15

## 2021-07-15 NOTE — PROGRESS NOTES
Name: Crystal Murphy       Employer Group:   [id number] [[id number]]     Insurance Information:        Referral Given: No Yane Rodriguez has a list of providers from last month.    Medication adherence:  Yes Yane Rodriguez is currently taking Hydroxazine 25 mg TID PRN, Lexapr

## 2021-08-12 ENCOUNTER — PATIENT OUTREACH (OUTPATIENT)
Dept: CASE MANAGEMENT | Age: 31
End: 2021-08-12

## 2021-08-12 NOTE — PROGRESS NOTES
Name: Catana Soulier       Employer Group:   [id number] [[id number]]     Insurance Information:        Referral Given: Yes Sending list of Providers   Medication adherence:  Yes Medications are Gabapentin 100 mg QAM and 300 mg QPM, Hydroxazine 25 mg TID PRN, Shola Sandoval

## 2021-09-16 ENCOUNTER — TELEPHONE (OUTPATIENT)
Dept: CASE MANAGEMENT | Age: 31
End: 2021-09-16

## 2021-09-20 ENCOUNTER — TELEPHONE (OUTPATIENT)
Dept: CASE MANAGEMENT | Age: 31
End: 2021-09-20

## 2021-09-23 ENCOUNTER — PATIENT OUTREACH (OUTPATIENT)
Dept: CASE MANAGEMENT | Age: 31
End: 2021-09-23

## 2021-09-23 NOTE — PROGRESS NOTES
Member has not responded to phone outreach attempts, Scooter Mcknight letter mailed with closure notification on 9/23/21.

## 2021-09-24 ENCOUNTER — PATIENT OUTREACH (OUTPATIENT)
Dept: CASE MANAGEMENT | Age: 31
End: 2021-09-24

## 2022-01-09 ENCOUNTER — HOSPITAL ENCOUNTER (EMERGENCY)
Facility: HOSPITAL | Age: 32
Discharge: LEFT BEFORE DISCHARGE | End: 2022-01-09
Payer: COMMERCIAL

## 2022-01-09 VITALS
TEMPERATURE: 98 F | DIASTOLIC BLOOD PRESSURE: 83 MMHG | RESPIRATION RATE: 18 BRPM | HEART RATE: 65 BPM | SYSTOLIC BLOOD PRESSURE: 118 MMHG | OXYGEN SATURATION: 98 %

## 2023-01-13 ENCOUNTER — HOSPITAL ENCOUNTER (EMERGENCY)
Facility: HOSPITAL | Age: 33
Discharge: HOME | End: 2023-01-13
Attending: EMERGENCY MEDICINE
Payer: COMMERCIAL

## 2023-01-13 VITALS
RESPIRATION RATE: 20 BRPM | TEMPERATURE: 98 F | HEART RATE: 97 BPM | DIASTOLIC BLOOD PRESSURE: 86 MMHG | HEIGHT: 59 IN | WEIGHT: 216 LBS | SYSTOLIC BLOOD PRESSURE: 136 MMHG | BODY MASS INDEX: 43.55 KG/M2 | OXYGEN SATURATION: 98 %

## 2023-01-13 DIAGNOSIS — M79.2 NEUROPATHIC PAIN: ICD-10-CM

## 2023-01-13 DIAGNOSIS — M25.531 RIGHT WRIST PAIN: Primary | ICD-10-CM

## 2023-01-13 PROCEDURE — 99283 EMERGENCY DEPT VISIT LOW MDM: CPT | Performed by: EMERGENCY MEDICINE

## 2023-01-13 PROCEDURE — 99284 EMERGENCY DEPT VISIT MOD MDM: CPT | Performed by: EMERGENCY MEDICINE

## 2023-01-13 RX ORDER — HYDROCODONE BITARTRATE AND ACETAMINOPHEN 5; 325 MG/1; MG/1
1 TABLET ORAL ONCE
Status: COMPLETED | OUTPATIENT
Start: 2023-01-13 | End: 2023-01-13

## 2023-01-13 RX ORDER — METHYLPREDNISOLONE 4 MG/1
TABLET ORAL
Qty: 1 EACH | Refills: 0 | Status: SHIPPED | OUTPATIENT
Start: 2023-01-13

## 2023-01-13 RX ORDER — GABAPENTIN 100 MG/1
100 CAPSULE ORAL 3 TIMES DAILY
Qty: 30 CAPSULE | Refills: 0 | Status: SHIPPED | OUTPATIENT
Start: 2023-01-13 | End: 2023-01-23

## 2023-01-13 RX ORDER — HYDROCODONE BITARTRATE AND ACETAMINOPHEN 5; 325 MG/1; MG/1
1 TABLET ORAL EVERY 6 HOURS PRN
Qty: 10 TABLET | Refills: 0 | Status: SHIPPED | OUTPATIENT
Start: 2023-01-13

## 2023-01-13 NOTE — ED INITIAL ASSESSMENT (HPI)
Pt reports right wrist and RYAN pain that start on Tuesday. Pt states she was hurt herself at work on Tuesday. Pt states that the pain is getting worse today. CMS present. Pt reports 6/10 pain.

## 2023-01-13 NOTE — ED QUICK NOTES
Declining EKG stating, \"we don't need it. It's not dealing with the heart. It's a muscle problem. \" Dr Walker Persons notified.

## 2023-01-27 ENCOUNTER — HOSPITAL ENCOUNTER (EMERGENCY)
Facility: HOSPITAL | Age: 33
Discharge: HOME | End: 2023-01-27
Attending: EMERGENCY MEDICINE
Payer: COMMERCIAL

## 2023-01-27 VITALS
HEIGHT: 59 IN | HEART RATE: 83 BPM | OXYGEN SATURATION: 97 % | DIASTOLIC BLOOD PRESSURE: 69 MMHG | BODY MASS INDEX: 43.55 KG/M2 | WEIGHT: 216 LBS | RESPIRATION RATE: 20 BRPM | TEMPERATURE: 98 F | SYSTOLIC BLOOD PRESSURE: 105 MMHG

## 2023-01-27 DIAGNOSIS — M54.50 ACUTE BILATERAL LOW BACK PAIN WITHOUT SCIATICA: ICD-10-CM

## 2023-01-27 DIAGNOSIS — S29.012A RHOMBOID MUSCLE STRAIN, INITIAL ENCOUNTER: Primary | ICD-10-CM

## 2023-01-27 PROCEDURE — 96372 THER/PROPH/DIAG INJ SC/IM: CPT

## 2023-01-27 PROCEDURE — 99283 EMERGENCY DEPT VISIT LOW MDM: CPT

## 2023-01-27 PROCEDURE — 99284 EMERGENCY DEPT VISIT MOD MDM: CPT

## 2023-01-27 RX ORDER — KETOROLAC TROMETHAMINE 10 MG/1
10 TABLET, FILM COATED ORAL EVERY 6 HOURS PRN
Qty: 20 TABLET | Refills: 0 | Status: SHIPPED | OUTPATIENT
Start: 2023-01-27 | End: 2023-02-01

## 2023-01-27 RX ORDER — HYDROCODONE BITARTRATE AND ACETAMINOPHEN 5; 325 MG/1; MG/1
2 TABLET ORAL ONCE
Status: COMPLETED | OUTPATIENT
Start: 2023-01-27 | End: 2023-01-27

## 2023-01-27 RX ORDER — HYDROCODONE BITARTRATE AND ACETAMINOPHEN 5; 325 MG/1; MG/1
1-2 TABLET ORAL EVERY 6 HOURS PRN
Qty: 15 TABLET | Refills: 0 | Status: SHIPPED | OUTPATIENT
Start: 2023-01-27

## 2023-01-27 RX ORDER — CYCLOBENZAPRINE HCL 10 MG
10 TABLET ORAL 3 TIMES DAILY PRN
Qty: 20 TABLET | Refills: 0 | Status: SHIPPED | OUTPATIENT
Start: 2023-01-27 | End: 2023-02-03

## 2023-01-27 RX ORDER — KETOROLAC TROMETHAMINE 30 MG/ML
30 INJECTION, SOLUTION INTRAMUSCULAR; INTRAVENOUS ONCE
Status: COMPLETED | OUTPATIENT
Start: 2023-01-27 | End: 2023-01-27

## 2023-01-28 NOTE — ED INITIAL ASSESSMENT (HPI)
Patient here with her boyfriend for intense pain in the right shoulder blade that is going down her back and legs. Patient was at work when a student got onto a desk and the patient was attempting to get the patient off the desk and felt that pain. Pt does state some tingling from her bottom and down her legs.

## 2023-11-10 ENCOUNTER — APPOINTMENT (OUTPATIENT)
Dept: GENERAL RADIOLOGY | Facility: HOSPITAL | Age: 33
End: 2023-11-10
Payer: COMMERCIAL

## 2023-11-10 PROCEDURE — 71046 X-RAY EXAM CHEST 2 VIEWS: CPT

## 2023-11-10 PROCEDURE — 99285 EMERGENCY DEPT VISIT HI MDM: CPT

## 2023-11-11 ENCOUNTER — APPOINTMENT (OUTPATIENT)
Dept: CT IMAGING | Facility: HOSPITAL | Age: 33
End: 2023-11-11
Attending: NURSE PRACTITIONER
Payer: COMMERCIAL

## 2023-11-11 ENCOUNTER — HOSPITAL ENCOUNTER (EMERGENCY)
Facility: HOSPITAL | Age: 33
Discharge: HOME | End: 2023-11-11
Payer: COMMERCIAL

## 2023-11-11 VITALS
TEMPERATURE: 98 F | DIASTOLIC BLOOD PRESSURE: 60 MMHG | BODY MASS INDEX: 42.33 KG/M2 | HEIGHT: 59 IN | OXYGEN SATURATION: 99 % | RESPIRATION RATE: 20 BRPM | SYSTOLIC BLOOD PRESSURE: 97 MMHG | HEART RATE: 95 BPM | WEIGHT: 210 LBS

## 2023-11-11 DIAGNOSIS — J18.9 COMMUNITY ACQUIRED PNEUMONIA, UNSPECIFIED LATERALITY: Primary | ICD-10-CM

## 2023-11-11 LAB
ANION GAP SERPL CALC-SCNC: 9 MMOL/L (ref 0–18)
BASOPHILS # BLD AUTO: 0.06 X10(3) UL (ref 0–0.2)
BASOPHILS NFR BLD AUTO: 0.5 %
BUN BLD-MCNC: 13 MG/DL (ref 9–23)
BUN/CREAT SERPL: 20 (ref 10–20)
CALCIUM BLD-MCNC: 9.4 MG/DL (ref 8.7–10.4)
CHLORIDE SERPL-SCNC: 104 MMOL/L (ref 98–112)
CO2 SERPL-SCNC: 25 MMOL/L (ref 21–32)
CREAT BLD-MCNC: 0.65 MG/DL
D DIMER PPP FEU-MCNC: 0.56 UG/ML FEU (ref ?–0.5)
DEPRECATED RDW RBC AUTO: 37.5 FL (ref 35.1–46.3)
EGFRCR SERPLBLD CKD-EPI 2021: 120 ML/MIN/1.73M2 (ref 60–?)
EOSINOPHIL # BLD AUTO: 0.19 X10(3) UL (ref 0–0.7)
EOSINOPHIL NFR BLD AUTO: 1.6 %
ERYTHROCYTE [DISTWIDTH] IN BLOOD BY AUTOMATED COUNT: 14 % (ref 11–15)
GLUCOSE BLD-MCNC: 87 MG/DL (ref 70–99)
HCT VFR BLD AUTO: 41.6 %
HGB BLD-MCNC: 13.4 G/DL
IMM GRANULOCYTES # BLD AUTO: 0.04 X10(3) UL (ref 0–1)
IMM GRANULOCYTES NFR BLD: 0.3 %
LYMPHOCYTES # BLD AUTO: 5.3 X10(3) UL (ref 1–4)
LYMPHOCYTES NFR BLD AUTO: 45 %
MCH RBC QN AUTO: 24.5 PG (ref 26–34)
MCHC RBC AUTO-ENTMCNC: 32.2 G/DL (ref 31–37)
MCV RBC AUTO: 76.1 FL
MONOCYTES # BLD AUTO: 0.75 X10(3) UL (ref 0.1–1)
MONOCYTES NFR BLD AUTO: 6.4 %
NEUTROPHILS # BLD AUTO: 5.44 X10 (3) UL (ref 1.5–7.7)
NEUTROPHILS # BLD AUTO: 5.44 X10(3) UL (ref 1.5–7.7)
NEUTROPHILS NFR BLD AUTO: 46.2 %
OSMOLALITY SERPL CALC.SUM OF ELEC: 285 MOSM/KG (ref 275–295)
PLATELET # BLD AUTO: 436 10(3)UL (ref 150–450)
POTASSIUM SERPL-SCNC: 4 MMOL/L (ref 3.5–5.1)
RBC # BLD AUTO: 5.47 X10(6)UL
S PYO AG THROAT QL: POSITIVE
SARS-COV-2 RNA RESP QL NAA+PROBE: NOT DETECTED
SODIUM SERPL-SCNC: 138 MMOL/L (ref 136–145)
TROPONIN I SERPL HS-MCNC: <3 NG/L
WBC # BLD AUTO: 11.8 X10(3) UL (ref 4–11)

## 2023-11-11 PROCEDURE — 80048 BASIC METABOLIC PNL TOTAL CA: CPT | Performed by: NURSE PRACTITIONER

## 2023-11-11 PROCEDURE — 94644 CONT INHLJ TX 1ST HOUR: CPT

## 2023-11-11 PROCEDURE — 99284 EMERGENCY DEPT VISIT MOD MDM: CPT

## 2023-11-11 PROCEDURE — 87880 STREP A ASSAY W/OPTIC: CPT

## 2023-11-11 PROCEDURE — 71260 CT THORAX DX C+: CPT | Performed by: NURSE PRACTITIONER

## 2023-11-11 PROCEDURE — 96375 TX/PRO/DX INJ NEW DRUG ADDON: CPT

## 2023-11-11 PROCEDURE — 93005 ELECTROCARDIOGRAM TRACING: CPT

## 2023-11-11 PROCEDURE — 96361 HYDRATE IV INFUSION ADD-ON: CPT

## 2023-11-11 PROCEDURE — 96374 THER/PROPH/DIAG INJ IV PUSH: CPT

## 2023-11-11 PROCEDURE — 85379 FIBRIN DEGRADATION QUANT: CPT | Performed by: NURSE PRACTITIONER

## 2023-11-11 PROCEDURE — 84484 ASSAY OF TROPONIN QUANT: CPT | Performed by: NURSE PRACTITIONER

## 2023-11-11 PROCEDURE — 93010 ELECTROCARDIOGRAM REPORT: CPT

## 2023-11-11 PROCEDURE — 85025 COMPLETE CBC W/AUTO DIFF WBC: CPT | Performed by: NURSE PRACTITIONER

## 2023-11-11 PROCEDURE — 85060 BLOOD SMEAR INTERPRETATION: CPT | Performed by: NURSE PRACTITIONER

## 2023-11-11 RX ORDER — AMOXICILLIN AND CLAVULANATE POTASSIUM 875; 125 MG/1; MG/1
1 TABLET, FILM COATED ORAL 2 TIMES DAILY
Qty: 20 TABLET | Refills: 0 | Status: SHIPPED | OUTPATIENT
Start: 2023-11-11 | End: 2023-11-21

## 2023-11-11 RX ORDER — DEXAMETHASONE SODIUM PHOSPHATE 10 MG/ML
10 INJECTION, SOLUTION INTRAMUSCULAR; INTRAVENOUS ONCE
Status: COMPLETED | OUTPATIENT
Start: 2023-11-11 | End: 2023-11-11

## 2023-11-11 RX ORDER — ALBUTEROL SULFATE 2.5 MG/3ML
2.5 SOLUTION RESPIRATORY (INHALATION) EVERY 4 HOURS PRN
Qty: 30 EACH | Refills: 0 | Status: SHIPPED | OUTPATIENT
Start: 2023-11-11 | End: 2023-12-11

## 2023-11-11 RX ORDER — PREDNISONE 20 MG/1
40 TABLET ORAL DAILY
Qty: 10 TABLET | Refills: 0 | Status: SHIPPED | OUTPATIENT
Start: 2023-11-11 | End: 2023-11-16

## 2023-11-11 RX ORDER — AZITHROMYCIN 250 MG/1
TABLET, FILM COATED ORAL
Qty: 6 TABLET | Refills: 0 | Status: SHIPPED | OUTPATIENT
Start: 2023-11-11 | End: 2023-11-16

## 2023-11-11 RX ORDER — ONDANSETRON 2 MG/ML
4 INJECTION INTRAMUSCULAR; INTRAVENOUS ONCE
Status: COMPLETED | OUTPATIENT
Start: 2023-11-11 | End: 2023-11-11

## 2023-11-11 RX ORDER — ALBUTEROL SULFATE 2.5 MG/3ML
10 SOLUTION RESPIRATORY (INHALATION) CONTINUOUS
Status: DISCONTINUED | OUTPATIENT
Start: 2023-11-11 | End: 2023-11-11

## 2023-11-11 RX ORDER — METOCLOPRAMIDE HYDROCHLORIDE 5 MG/ML
10 INJECTION INTRAMUSCULAR; INTRAVENOUS ONCE
Status: COMPLETED | OUTPATIENT
Start: 2023-11-11 | End: 2023-11-11

## 2023-11-11 NOTE — ED INITIAL ASSESSMENT (HPI)
Pt presents to the ED with c/o shortness of breath for two weeks. +emesis for three days. Pt diagnosed with bronchitis two week ago. Pt speaking in full sentences, unlabored breathing.

## 2023-11-13 LAB
ATRIAL RATE: 72 BPM
P AXIS: 37 DEGREES
P-R INTERVAL: 180 MS
Q-T INTERVAL: 422 MS
QRS DURATION: 74 MS
QTC CALCULATION (BEZET): 462 MS
R AXIS: 57 DEGREES
T AXIS: 39 DEGREES
VENTRICULAR RATE: 72 BPM

## (undated) DEVICE — 6 ML SYRINGE LUER-LOCK TIP: Brand: MONOJECT

## (undated) DEVICE — MEDI-VAC NON-CONDUCTIVE SUCTION TUBING 6MM X 1.8M (6FT.) L: Brand: CARDINAL HEALTH

## (undated) DEVICE — LINE MNTR ADLT SET O2 INTMD

## (undated) DEVICE — CONMED SCOPE SAVER BITE BLOCK, 20X27 MM: Brand: SCOPE SAVER

## (undated) DEVICE — Device: Brand: CUSTOM PROCEDURE KIT

## (undated) DEVICE — FORCEP RADIAL JAW 4

## (undated) DEVICE — 3 ML SYRINGE LUER-LOCK TIP: Brand: MONOJECT

## (undated) NOTE — LETTER
[address], [place]  Authorization for Invasive Procedures  1.  I hereby authorize Dr. Mani Holder** , my physician and whomever may be designated as the doctor's assistant, to perform the following operation and/or procedure:  *Esophag of the potential risks that can occur: fever and allergic reactions, hemolytic reactions, transmission of disease such as hepatitis, AIDS, cytomegalovirus (CMV), and flluid overload.  In the event that I wish to have autologous transfusions of my own blood, in the judgment of my physician.      Signature of Patient:  ________________________________________________ Date: _________Time: _________    Responsible person in case of minor or unconscious: _____________________________Relationship: ____________     Marvin Yu

## (undated) NOTE — LETTER
[facility]  Administration of Anesthesia  1. Dayton Jarvis, or _________________________________ acting on her behalf, (Patient) (Dependent/Representative) request to receive anesthesia for my pending procedure/operation/treatment. bleeding, seizure, cardiac arrest and death. 7. AWARENESS: I understand that it is possible (but unlikely) to have explicit memory of events from the operating room while under general anesthesia.   8. ELECTROCONVULSIVE THERAPY PATIENTS: This consent serve below affirms that prior to the time of the procedure, I have explained to the patient and/or his/her guardian, the risks and benefits of undergoing anesthesia, as well as any reasonable alternatives.     ___________________________________________________

## (undated) NOTE — ED AVS SNAPSHOT
Erasmo Charles   MRN: [id number]    Department:  [facility] Emergency Department   Date of Visit:  4/17/2019           Disclosure     Insurance plans vary and the physician(s) referred by the ER may not be covered by your plan.  Please contact CARE PHYSICIAN AT ONCE OR RETURN IMMEDIATELY TO THE EMERGENCY DEPARTMENT. If you have been prescribed any medication(s), please fill your prescription right away and begin taking the medication(s) as directed.   If you believe that any of the medications

## (undated) NOTE — LETTER
[facility]  Administration of Anesthesia  1. Polo Mckeon, or _________________________________ acting on her behalf, (Patient) (Dependent/Representative) request to receive anesthesia for my pending procedure/operation/treatment. bleeding, seizure, cardiac arrest and death. 7. AWARENESS: I understand that it is possible (but unlikely) to have explicit memory of events from the operating room while under general anesthesia.   8. ELECTROCONVULSIVE THERAPY PATIENTS: This consent serve below affirms that prior to the time of the procedure, I have explained to the patient and/or his/her guardian, the risks and benefits of undergoing anesthesia, as well as any reasonable alternatives.     ___________________________________________________

## (undated) NOTE — LETTER
Grant Tony [address], [place], [address] [facility]  [address]  INFORMED CONSENT FOR TRANSFUSION OF BLOOD OR BLOOD PRODUCTS  My physician has informed me of the nature, purpose, benefits and risks of transfusion for blood and blood components that ______________________________________________  (Signature of Patient)                                                            (Responsible party in case of Minor,

## (undated) NOTE — ED AVS SNAPSHOT
Qi Ware   MRN: [id number]    Department:  [facility] Emergency Department   Date of Visit:  4/30/2018           Disclosure     Insurance plans vary and the physician(s) referred by the ER may not be covered by your plan.  Please conta CARE PHYSICIAN AT ONCE OR RETURN IMMEDIATELY TO THE EMERGENCY DEPARTMENT. If you have been prescribed any medication(s), please fill your prescription right away and begin taking the medication(s) as directed.   If you believe that any of the medications

## (undated) NOTE — ED AVS SNAPSHOT
Lynne Aman   MRN: [id number]    Department:  [facility] Emergency Department   Date of Visit:  4/28/2018           Disclosure     Insurance plans vary and the physician(s) referred by the ER may not be covered by your plan.  Please conta CARE PHYSICIAN AT ONCE OR RETURN IMMEDIATELY TO THE EMERGENCY DEPARTMENT. If you have been prescribed any medication(s), please fill your prescription right away and begin taking the medication(s) as directed.   If you believe that any of the medications